# Patient Record
Sex: MALE | Race: WHITE | NOT HISPANIC OR LATINO
[De-identification: names, ages, dates, MRNs, and addresses within clinical notes are randomized per-mention and may not be internally consistent; named-entity substitution may affect disease eponyms.]

---

## 2018-03-30 ENCOUNTER — APPOINTMENT (OUTPATIENT)
Dept: OPHTHALMOLOGY | Facility: CLINIC | Age: 38
End: 2018-03-30
Payer: COMMERCIAL

## 2018-03-30 PROCEDURE — 92004 COMPRE OPH EXAM NEW PT 1/>: CPT

## 2019-03-25 ENCOUNTER — APPOINTMENT (OUTPATIENT)
Dept: OTOLARYNGOLOGY | Facility: CLINIC | Age: 39
End: 2019-03-25

## 2020-10-30 ENCOUNTER — APPOINTMENT (OUTPATIENT)
Dept: INTERNAL MEDICINE | Facility: CLINIC | Age: 40
End: 2020-10-30

## 2020-10-30 ENCOUNTER — APPOINTMENT (OUTPATIENT)
Dept: NEUROLOGY | Facility: CLINIC | Age: 40
End: 2020-10-30

## 2020-12-11 ENCOUNTER — APPOINTMENT (OUTPATIENT)
Dept: INTERNAL MEDICINE | Facility: CLINIC | Age: 40
End: 2020-12-11

## 2022-05-06 ENCOUNTER — APPOINTMENT (OUTPATIENT)
Dept: ORTHOPEDIC SURGERY | Facility: CLINIC | Age: 42
End: 2022-05-06
Payer: COMMERCIAL

## 2022-05-06 VITALS — HEIGHT: 68 IN | WEIGHT: 180 LBS | BODY MASS INDEX: 27.28 KG/M2

## 2022-05-06 PROCEDURE — 99204 OFFICE O/P NEW MOD 45 MIN: CPT

## 2022-05-07 ENCOUNTER — NON-APPOINTMENT (OUTPATIENT)
Age: 42
End: 2022-05-07

## 2022-06-17 ENCOUNTER — APPOINTMENT (OUTPATIENT)
Dept: INTERNAL MEDICINE | Facility: CLINIC | Age: 42
End: 2022-06-17
Payer: COMMERCIAL

## 2022-06-17 VITALS
RESPIRATION RATE: 12 BRPM | SYSTOLIC BLOOD PRESSURE: 150 MMHG | OXYGEN SATURATION: 98 % | HEART RATE: 80 BPM | TEMPERATURE: 98 F | DIASTOLIC BLOOD PRESSURE: 90 MMHG

## 2022-06-17 VITALS — WEIGHT: 165 LBS | BODY MASS INDEX: 25.01 KG/M2 | HEIGHT: 68 IN

## 2022-06-17 DIAGNOSIS — H11.243: ICD-10-CM

## 2022-06-17 DIAGNOSIS — Z00.00 ENCOUNTER FOR GENERAL ADULT MEDICAL EXAMINATION W/OUT ABNORMAL FINDINGS: ICD-10-CM

## 2022-06-17 DIAGNOSIS — M25.559 PAIN IN UNSPECIFIED HIP: ICD-10-CM

## 2022-06-17 DIAGNOSIS — Z80.3 FAMILY HISTORY OF MALIGNANT NEOPLASM OF BREAST: ICD-10-CM

## 2022-06-17 DIAGNOSIS — Z80.42 FAMILY HISTORY OF MALIGNANT NEOPLASM OF PROSTATE: ICD-10-CM

## 2022-06-17 DIAGNOSIS — H15.101 UNSPECIFIED EPISCLERITIS, RIGHT EYE: ICD-10-CM

## 2022-06-17 PROCEDURE — 99386 PREV VISIT NEW AGE 40-64: CPT

## 2022-06-17 NOTE — HEALTH RISK ASSESSMENT
[Very Good] : ~his/her~  mood as very good [Never] : Never [Yes] : Yes [Monthly or less (1 pt)] : Monthly or less (1 point) [1 or 2 (0 pts)] : 1 or 2 (0 points) [Never (0 pts)] : Never (0 points) [No] : In the past 12 months have you used drugs other than those required for medical reasons? No [No falls in past year] : Patient reported no falls in the past year [0] : 2) Feeling down, depressed, or hopeless: Not at all (0) [PHQ-2 Negative - No further assessment needed] : PHQ-2 Negative - No further assessment needed [FreeTextEntry1] : risk for cancer [Audit-CScore] : 1 [de-identified] : as above [de-identified] : as above [QMR2Ivthb] : 0

## 2022-06-17 NOTE — PLAN
[FreeTextEntry1] : White coat Hypertension: per patient has normal BP readings outside of office--pa tient to monitor BP, if trends >130/80 to inform.\par Discussed his personal risk of cancer given family history--reviewed no apparent link between his family history and colon cancer, but will refer to GI per his request for screening. Normal breast exam. He will inquire with family on any MRCA positive or other genetic-related cancers in the family.\par PSA ordered.\par He will have labs done at local lab as he is nonfasting today.\par Healthy diet/exercise reviewed.

## 2022-06-17 NOTE — HISTORY OF PRESENT ILLNESS
[FreeTextEntry1] : new patient/CPE [de-identified] : LORENZO AJNE is a 41 year old male with no significant medical history presents for new patient comprehensive exam.\par He wishes to discuss his own potential risk of cancer given family history of breast cancer and prostate cancer. He denies any apparent changes in bowel habits, no urinary issues. Has switched to a plant-based diet in an effort improve his general health.\par Symptoms of erectile dysfunction, improved on Cialis 20 mg, has not filled recently. He feels Cialis helps more than Viagra, but when he goes for "plane flight I prefer Viagra because it is shorter acting...doesn't make me congested on the flight". \maritza Has h/o white coat hypertension, gets anxious in the office.\maritza Keeps active, walks 3x/week 2 hours at a time. \par Getting  in September.

## 2022-06-17 NOTE — PHYSICAL EXAM
[Normal Sclera/Conjunctiva] : normal sclera/conjunctiva [Normal Outer Ear/Nose] : the outer ears and nose were normal in appearance [Normal Oropharynx] : the oropharynx was normal [Normal Appearance] : normal in appearance [No Masses] : no palpable masses [No Nipple Discharge] : no nipple discharge [Normal] : normal gait, coordination grossly intact, no focal deficits and deep tendon reflexes were 2+ and symmetric [Speech Grossly Normal] : speech grossly normal [Alert and Oriented x3] : oriented to person, place, and time [Normal Insight/Judgement] : insight and judgment were intact [de-identified] : patient declined ophthalmoscopic exam [de-identified] : Pateint declined otoscopic exam. Teeth--++plaque multiple teeth [de-identified] : +small ubsutaneous cyst right/mid back. Left wrist fluid-filled [de-identified] : anxious mood

## 2022-09-09 ENCOUNTER — APPOINTMENT (OUTPATIENT)
Dept: ORTHOPEDIC SURGERY | Facility: CLINIC | Age: 42
End: 2022-09-09

## 2022-09-16 ENCOUNTER — APPOINTMENT (OUTPATIENT)
Dept: ORTHOPEDIC SURGERY | Facility: CLINIC | Age: 42
End: 2022-09-16

## 2022-09-16 ENCOUNTER — NON-APPOINTMENT (OUTPATIENT)
Age: 42
End: 2022-09-16

## 2022-09-16 PROCEDURE — 73610 X-RAY EXAM OF ANKLE: CPT | Mod: RT

## 2022-09-16 PROCEDURE — 99214 OFFICE O/P EST MOD 30 MIN: CPT

## 2022-09-16 PROCEDURE — 73564 X-RAY EXAM KNEE 4 OR MORE: CPT | Mod: 50

## 2022-09-23 ENCOUNTER — APPOINTMENT (OUTPATIENT)
Dept: ORTHOPEDIC SURGERY | Facility: CLINIC | Age: 42
End: 2022-09-23

## 2022-09-23 PROCEDURE — 99214 OFFICE O/P EST MOD 30 MIN: CPT

## 2022-12-07 ENCOUNTER — NON-APPOINTMENT (OUTPATIENT)
Age: 42
End: 2022-12-07

## 2022-12-07 ENCOUNTER — APPOINTMENT (OUTPATIENT)
Dept: OTOLARYNGOLOGY | Facility: CLINIC | Age: 42
End: 2022-12-07

## 2022-12-07 VITALS
WEIGHT: 170 LBS | SYSTOLIC BLOOD PRESSURE: 155 MMHG | BODY MASS INDEX: 25.76 KG/M2 | HEART RATE: 85 BPM | DIASTOLIC BLOOD PRESSURE: 93 MMHG | TEMPERATURE: 97.3 F | HEIGHT: 68 IN

## 2022-12-07 DIAGNOSIS — H92.02 OTALGIA, LEFT EAR: ICD-10-CM

## 2022-12-07 DIAGNOSIS — M26.609 UNSPECIFIED TEMPOROMANDIBULAR JOINT DISORDER: ICD-10-CM

## 2022-12-07 PROCEDURE — 99203 OFFICE O/P NEW LOW 30 MIN: CPT

## 2022-12-07 NOTE — ASSESSMENT
[FreeTextEntry1] : Mr. JANE 42 year M complains of left side ear pain x 2 days. Had a hearing test which was wnl . \par \par Otalgia Left due to TMJ:\par -soft food diet \par -dental evaluation \par -Advil for pain \par -warm and cold compresses\par -Acupuncture advised \par \par \par f/u prn

## 2022-12-07 NOTE — PHYSICAL EXAM
[Midline] : trachea located in midline position [Normal] : no rashes [de-identified] : Pain to palpation left side

## 2022-12-07 NOTE — END OF VISIT
[FreeTextEntry3] : I personally saw and examined LORENZO JANE in detail. I spoke to GUSTAVO Iniguez regarding the assessment and plan of care. I reviewed the above assessment and plan of care, and agree. I have made changes in changes in the body of the note where appropriate.I personally reviewed the HPI, PMH, FH, SH, ROS and medications/allergies. I have spoken to GUSTAVO Iniguez regarding the history and have personally determined the assessment and plan of care, and documented this myself. I was present and participated in all key portions of the encounter and all procedures noted above. I have made changes in the body of the note where appropriate.\par \par Attesting Faculty: See Attending Signature Below \par \par \par  [Time Spent: ___ minutes] : I have spent [unfilled] minutes of time on the encounter.

## 2022-12-07 NOTE — HISTORY OF PRESENT ILLNESS
[de-identified] : PAtient complains of left side ear pain and discomfort x 2 days. He had a hearing test which was wnl, the audiologist told him that he has swelling in his left ear. He went to  was told by 2 MD that his ears are clean and w/o infection. He saw an ENT this morning was told that his ear is normal and w/o infection. \par Hx of tinnitus x 4 years, last month he had a cold and since then the pitch of the tinnitus changed. \par As of late he has been grinding his teeth under a lot of stress  [Hearing Loss] : no hearing loss [Tinnitus] : tinnitus [Otalgia] : otalgia [Nasal Congestion] : no nasal congestion [None] : The patient is currently asymptomatic.

## 2022-12-20 ENCOUNTER — APPOINTMENT (OUTPATIENT)
Dept: ORTHOPEDIC SURGERY | Facility: CLINIC | Age: 42
End: 2022-12-20

## 2022-12-20 VITALS — WEIGHT: 170 LBS | HEIGHT: 68 IN | BODY MASS INDEX: 25.76 KG/M2

## 2022-12-20 PROCEDURE — 99203 OFFICE O/P NEW LOW 30 MIN: CPT

## 2022-12-20 NOTE — HISTORY OF PRESENT ILLNESS
[1] : 2 [Dull/Aching] : dull/aching [Localized] : localized [Full time] : Work status: full time [de-identified] : 12/20/22: Patient is a 41 yo male c/o right knee pain since May 2022 with no specific injury. Was seen by another ortho, told PFS. Not taking any medication for pain. No previous surgeries to right knee.  [] : Post Surgical Visit: no [FreeTextEntry1] : Rt knee [FreeTextEntry5] : PAtient complains of knee pain since 1 week ago has X rays from [facility]\par no injury

## 2022-12-20 NOTE — PHYSICAL EXAM
[5___] : hamstring 5[unfilled]/5 [Negative] : negative Ulices's [Bilateral] : knee bilaterally [] : non-antalgic

## 2022-12-20 NOTE — ASSESSMENT
[FreeTextEntry1] : No pain on exam. Full ROM. No difficulty ambulating\par Reassured\par Recommend course of PT/HEP.\par otc anti-inflammatories / ice if needed\par Follow up prn

## 2022-12-23 ENCOUNTER — APPOINTMENT (OUTPATIENT)
Dept: ORTHOPEDIC SURGERY | Facility: CLINIC | Age: 42
End: 2022-12-23

## 2022-12-23 PROCEDURE — 99214 OFFICE O/P EST MOD 30 MIN: CPT

## 2022-12-27 ENCOUNTER — APPOINTMENT (OUTPATIENT)
Dept: ORTHOPEDIC SURGERY | Facility: CLINIC | Age: 42
End: 2022-12-27

## 2022-12-27 VITALS — HEIGHT: 68 IN | BODY MASS INDEX: 25.76 KG/M2 | WEIGHT: 170 LBS

## 2022-12-27 PROCEDURE — 99203 OFFICE O/P NEW LOW 30 MIN: CPT

## 2022-12-27 NOTE — HISTORY OF PRESENT ILLNESS
[3] : 3 [Dull/Aching] : dull/aching [Localized] : localized [Full time] : Work status: full time [de-identified] : 12/27/22: Here to f/u on right knee. Very concerned after seeing another ortho about his patella tendonitis diagnosis. Has not started PT despite recommended by ortho and myself. No n/t. Not taking any medication for pain. \par \par 12/20/22: Patient is a 43 yo male c/o right knee pain since May 2022 with no specific injury. Was seen by another ortho, told PFS. Not taking any medication for pain. No previous surgeries to right knee.  [] : Post Surgical Visit: no [FreeTextEntry1] : Rt knee [FreeTextEntry5] : Patient returns with more pain in his right knee since 12/23/22

## 2022-12-27 NOTE — ASSESSMENT
[FreeTextEntry1] : Reassured again\par Again, strongly recommend course of PT/HEP\par otc anti-inflammatories as needed\par

## 2022-12-27 NOTE — PHYSICAL EXAM
[Bilateral] : knee bilaterally [5___] : hamstring 5[unfilled]/5 [Negative] : negative Ulices's [] : non-antalgic

## 2022-12-30 ENCOUNTER — APPOINTMENT (OUTPATIENT)
Dept: ORTHOPEDIC SURGERY | Facility: CLINIC | Age: 42
End: 2022-12-30
Payer: COMMERCIAL

## 2022-12-30 PROCEDURE — 99214 OFFICE O/P EST MOD 30 MIN: CPT

## 2023-01-06 ENCOUNTER — APPOINTMENT (OUTPATIENT)
Dept: ORTHOPEDIC SURGERY | Facility: CLINIC | Age: 43
End: 2023-01-06
Payer: COMMERCIAL

## 2023-01-06 VITALS — WEIGHT: 170 LBS | HEIGHT: 68 IN | BODY MASS INDEX: 25.76 KG/M2

## 2023-01-06 PROCEDURE — 73562 X-RAY EXAM OF KNEE 3: CPT | Mod: 50

## 2023-01-06 PROCEDURE — 99213 OFFICE O/P EST LOW 20 MIN: CPT

## 2023-01-06 NOTE — PHYSICAL EXAM
[Bilateral] : knee bilaterally [5___] : hamstring 5[unfilled]/5 [Negative] : negative Ulices's [] : non-antalgic [Right] : right knee [AP] : anteroposterior [Lateral] : lateral [There are no fractures, subluxations or dislocations. No significant abnormalities are seen] : There are no fractures, subluxations or dislocations. No significant abnormalities are seen

## 2023-01-06 NOTE — HISTORY OF PRESENT ILLNESS
[3] : 3 [Dull/Aching] : dull/aching [Localized] : localized [Full time] : Work status: full time [de-identified] : 1-6-23- \par \par 12/27/22: Here to f/u on right knee. Very concerned after seeing another ortho about his patella tendonitis diagnosis. Has not started PT despite recommended by ortho and myself. No n/t. Not taking any medication for pain. \par \par 12/20/22: Patient is a 41 yo male c/o right knee pain since May 2022 with no specific injury. Was seen by another ortho, told PFS. Not taking any medication for pain. No previous surgeries to right knee.  [] : Post Surgical Visit: no [FreeTextEntry1] : Rt knee [FreeTextEntry5] : Patient returns with more pain in his right knee since 12/23/22

## 2023-01-06 NOTE — ASSESSMENT
[FreeTextEntry1] : I am uncertain as to where his pain is coming from, it is out of proportion to physical exam. Will get MRI for further information, start course of PT as well

## 2023-01-13 ENCOUNTER — APPOINTMENT (OUTPATIENT)
Dept: ORTHOPEDIC SURGERY | Facility: CLINIC | Age: 43
End: 2023-01-13

## 2023-01-20 ENCOUNTER — APPOINTMENT (OUTPATIENT)
Dept: ORTHOPEDIC SURGERY | Facility: CLINIC | Age: 43
End: 2023-01-20
Payer: COMMERCIAL

## 2023-01-20 PROCEDURE — 99443: CPT

## 2023-01-25 ENCOUNTER — NON-APPOINTMENT (OUTPATIENT)
Age: 43
End: 2023-01-25

## 2023-01-27 ENCOUNTER — APPOINTMENT (OUTPATIENT)
Dept: ORTHOPEDIC SURGERY | Facility: CLINIC | Age: 43
End: 2023-01-27
Payer: COMMERCIAL

## 2023-01-27 VITALS — HEIGHT: 68 IN | BODY MASS INDEX: 25.76 KG/M2 | WEIGHT: 170 LBS

## 2023-01-27 DIAGNOSIS — M77.8 OTHER ENTHESOPATHIES, NOT ELSEWHERE CLASSIFIED: ICD-10-CM

## 2023-01-27 DIAGNOSIS — M25.522 PAIN IN LEFT ELBOW: ICD-10-CM

## 2023-01-27 DIAGNOSIS — M77.12 LATERAL EPICONDYLITIS, LEFT ELBOW: ICD-10-CM

## 2023-01-27 PROCEDURE — 73080 X-RAY EXAM OF ELBOW: CPT | Mod: LT

## 2023-01-27 NOTE — PHYSICAL EXAM
[de-identified] : General Exam\par \par Well developed, well nourished\par No apparent distress\par Oriented to person, place, and time\par Mood: Normal\par Affect: Normal\par Balance and coordination: Normal\par Gait: Normal\par \par left elbow exam:\par \par Skin: Clean, dry, intact. No ecchymosis. No swelling. No palpable joint effusion. No gross deformity.\par Tenderness: + tenderness to palpation lateral epicondyle, No medial epicondyle, olecranon, radial head. Pain with resisted wrist ext and supination\par ROM:0-140° full pronation full supination\par Stability: Stable to vaus/valgus stress\par Neuro: Negative tinels at ulnar canal, AIN/PIN/Ulnar nerve in tact to motor/sensation.\par Strength: 5/5 elbow flexion, 5/5 elbow extension, 5/5 supination, 5/5 pronation\par Sensation: In tact to light touch throughout\par Vasc: 2+ radial pulse, <2s cap refill\par  [de-identified] : \par The following radiographs were ordered and read by me during this patients visit. I reviewed each radiograph in detail with the patient and discussed the findings as highlighted below. \par \par 3 views left elbow] were obtained today that show no fracture, dislocation. There is no degenerative change seen. There is no malalignment. No obvious osseous abnormality. Otherwise unremarkable.

## 2023-01-27 NOTE — DISCUSSION/SUMMARY
[de-identified] : Left elbow lateral epicondylitis in the setting of using a wheelchair.  I discussed with the patient the treatment of lateral epicondylitis. I discussed that this is a degenerative condition rather than an inflammatory process. The process is reversible, and the best source of treatment is to reduce the offending repetitive overuse injury process. I counseled the patient how to do this. In addition, treatment includes counterforce bracing, physical therapy for stretching and strengthening, and the use of injection therapy (steroids/PRP etc). I also discussed the role of surgical intervention for may become a chronic condition.  All questions answered follow-up as needed.

## 2023-01-27 NOTE — HISTORY OF PRESENT ILLNESS
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  He has been using wheelchair for knee problems x several weeks, developed pain primarily about the lateral and medial  elbow.  \par \par The patient's past medical history, past surgical history, medications, allergies, and social history were reviewed by me today with the patient and documented accordingly. In addition, the patient's family history, which is noncontributory to this visit, was also reviewed.\par

## 2023-02-01 ENCOUNTER — APPOINTMENT (OUTPATIENT)
Dept: OTOLARYNGOLOGY | Facility: CLINIC | Age: 43
End: 2023-02-01

## 2023-02-03 ENCOUNTER — APPOINTMENT (OUTPATIENT)
Dept: ORTHOPEDIC SURGERY | Facility: CLINIC | Age: 43
End: 2023-02-03
Payer: SELF-PAY

## 2023-02-03 VITALS — WEIGHT: 170 LBS | HEIGHT: 68 IN | BODY MASS INDEX: 25.76 KG/M2

## 2023-02-03 DIAGNOSIS — M77.8 OTHER ENTHESOPATHIES, NOT ELSEWHERE CLASSIFIED: ICD-10-CM

## 2023-02-03 PROCEDURE — 99211 OFF/OP EST MAY X REQ PHY/QHP: CPT

## 2023-02-03 NOTE — HISTORY OF PRESENT ILLNESS
[Gradual] : gradual [4] : 4 [2] : 2 [Dull/Aching] : dull/aching [Nothing helps with pain getting better] : Nothing helps with pain getting better [de-identified] : Overusing arms more recently\par \par L more than R elbow pain \par \par Now is better  [] : no [FreeTextEntry1] :  left elbow/ hand  [FreeTextEntry3] : 1-2 weeks ago  [FreeTextEntry5] : he has pain, no injury  [de-identified] : activity  [de-identified] : 01/27/23 [de-identified] : urgent care  [de-identified] : XR

## 2023-02-17 ENCOUNTER — NON-APPOINTMENT (OUTPATIENT)
Age: 43
End: 2023-02-17

## 2023-02-17 ENCOUNTER — APPOINTMENT (OUTPATIENT)
Dept: INTERNAL MEDICINE | Facility: CLINIC | Age: 43
End: 2023-02-17
Payer: COMMERCIAL

## 2023-02-17 ENCOUNTER — APPOINTMENT (OUTPATIENT)
Dept: ORTHOPEDIC SURGERY | Facility: CLINIC | Age: 43
End: 2023-02-17
Payer: COMMERCIAL

## 2023-02-17 VITALS — WEIGHT: 175 LBS | BODY MASS INDEX: 26.52 KG/M2 | HEIGHT: 68 IN

## 2023-02-17 DIAGNOSIS — M25.572 PAIN IN RIGHT ANKLE AND JOINTS OF RIGHT FOOT: ICD-10-CM

## 2023-02-17 DIAGNOSIS — M76.50 PATELLAR TENDINITIS, UNSPECIFIED KNEE: ICD-10-CM

## 2023-02-17 DIAGNOSIS — M25.571 PAIN IN RIGHT ANKLE AND JOINTS OF RIGHT FOOT: ICD-10-CM

## 2023-02-17 PROCEDURE — 99214 OFFICE O/P EST MOD 30 MIN: CPT

## 2023-02-17 PROCEDURE — 99214 OFFICE O/P EST MOD 30 MIN: CPT | Mod: 25

## 2023-02-17 NOTE — HISTORY OF PRESENT ILLNESS
[de-identified] : Patient is here for evaluation of bilateral lower extremity pain.  Patient has been having 2-3 months of atraumatic onset of bilateral thigh, knee and ankle pain.  Patient is seen numerous providers for these issues already.  Review of his chart shows he seen multiple orthopedic providers as well as his PCP as recently as yesterday.  Patient has been intermittently using a wheelchair as he feels like he cannot walk and support himself at all times.  Patient feels like he has achiness as well as intermittent burning in different parts of his posterior thigh, lower leg, ankle and foot.  Patient has been utilizing virtual physical therapy with a physiotherapist in [place] to treat this issue.  No medications as of yet.\par \par The patient's past medical history, past surgical history, medications and allergies were reviewed by me today and documented accordingly. In addition, the patient's family and social history, which were noncontributory to this visit, were reviewed also. Intake form was reviewed. The patient has no family history of arthritis.

## 2023-02-17 NOTE — PHYSICAL EXAM
[de-identified] : anxious mood, appears slightly unkempt [de-identified] : nontender on palpation of all lower extremity joints bilaterally, normal ROM. Area of his subjective pain is in anterior ankles extending to shins and in posterior lower thighs/hamstrings. [de-identified] : very anxious mood

## 2023-02-17 NOTE — HISTORY OF PRESENT ILLNESS
[FreeTextEntry8] : LORENZO JANE is a 42 year old male who presents for acute focused medical visit, with c/o burning sensation in lower extremities.\par We had briefly spoken over the phone 2 days ago when he had described that for the past week has developed burning pain in posterior thigh/hamstring area, as well as in anterior aspect of ankles radiating into shins bilaterally. Pain is "excruciating", however is able to walk. He has been using wheelchair in an effort to "immobilize" his lower body. \par Preceding his pain, he has been doing hamstring/quad exercises for management of his bilateral knee pains under guidance of a Physiotherapist (who is based in [place] and he was doing exercises per her guidance on Skype)--he describes having done various exercises involving repetitive extension/flexion of feet, going from toes to heels. Immediately before onset of his burning pains, he was "going on my tiptoes for a few minutes at a time". He admits he had "googled" and self-diagnosed  Hoffas fat pad impingement, he also verbalizes concern over possible "rheumatoid arthritis or other rheumatological condition". He has had no history of swollen joints, morning stiffness. Has h/o elbow tendinitis.\par Note he has seen multiple orthopedists prior to his visit with physiotherapist for his knee symptoms. \par He admits he has significant "health anxiety" which he acknowledges is likely contributing to/worsening his symtpoms. He declines to take any medications for his anxiety due to h/o "tinnitus" with all medications. He is a psychiatrist by profession.

## 2023-02-17 NOTE — PHYSICAL EXAM
[de-identified] : Constitutional: Well-nourished, well-developed, No acute distress, + poor hygiene with noted foul-smelling odor\par Respiratory:  Good respiratory effort, no SOB\par Lymphatic: No regional lymphadenopathy, no lymphedema\par Psychiatric: Pleasant and normal affect, alert and oriented x3\par Musculoskeletal: normal except where as noted in regional exam\par \par Bilateral knees:\par APPEARANCE: + Apprehension and guarding during physical exam maneuvers.  No marked deformities, no swelling or malalignment\par POSITIVE TENDERNESS:  + crepitus of the anterior knee, and tenderness of patellar retinaculum\par NONTENDER: jt lines b/l, patellar & quadriceps tendons, MCL/LCL, ITB at the lateral femoral condyle & Gerdy's tubercle, pes bursa. \par ROM: full with discomfort in knee flexion and knee extension\par RESISTIVE TESTING: + discomfort with knee ext from deep knee flexion (stretched position), painless knee flexion. \par SPECIAL TESTS: stable v/v stress. painless grind. neg Lachman's. neg ant/post drawer. neg Ulices's. \par \par Bilateral ankles:\par APPEARANCE: no swelling, no marked deformities or malalignment\par POSITIVE TENDERNESS: none\par NONTENDER: medial malleolus, lateral malleolus, tibialis posterior tendon, achilles tendon, no marked thickening of tendon, ATFL, CFL, PTFL, anterior tibiofibular ligament (high ankle), sinus tarsus, deltoid ligaments, 5th metatarsal. \par RANGE OF MOTION: full & painless. \par RESISTIVE TESTING: painless resisted dorsiflexion, plantar flexion, inversion & eversion. \par SPECIAL TESTS: neg anterior drawer. neg talar tilt. neg Kleiger's\par NEURO: Normal sensation of LE, DTRs 2+/4 patella and achilles\par PULSES: 2+ DP/PT pulses\par

## 2023-02-17 NOTE — DISCUSSION/SUMMARY
[de-identified] : Patient was seen today for evaluation and management of chronic intermittent bilateral thigh, knee, calf, ankle and foot pain.  Patient has seen a multitude of providers for these issues.  Patient has essentially no abnormal findings on clinical exam, he has clinical exam findings consistent with patellofemoral pain syndrome and diffuse myofascial pain.  Patient describes his pain as 8 out of 10 and burning in nature, however his pain does not follow any known neuropathic pattern.  He has no evidence of radiculopathy as he has no reproducible pain on clinical exam, he just points at different parts of his thigh, knee, calf, ankle, and foot at different parts of today's encounter describing pain that is burning in nature to him.  Patient is significantly apprehensive during clinical exam, he will not let me fully flex or extend his knee, he states he is afraid of compressing his Hoffa's fat pad.  Patient is advised that inflammation within Hoffa's fat pad is essentially an MRI finding only.  This does not cause mechanical obstruction limiting knee extension, nor is it anything that any of my orthopedic associates would usually surgically address in isolation.  Patient is advised that he is perseverating over an MRI finding and extrapolating it to make it seem like it is the etiology of his pain.  Advised the patient if he continues to have burning pain of the lower extremities I would recommend neurology consultation, however at this time I do not feel that it would be beneficial to him as I feel the majority of the patient's pain is psychiatric in nature.  Patient has documented anxiety about health as per his PCP, I agree with this assessment as patient is perseverating over much of his clinical exam and his symptoms without any reproducible symptoms on today's orthopedic evaluation.  I had a very beba and direct conversation with the patient today.  It seems that the majority of his issues are psychiatric in nature, and I advised the patient as such.  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  Patient has been utilizing virtual physical therapy with a physiotherapist in [place].  They recently recommended he limit his walking to 1600 steps per day, and gradually increase his walking tolerance thereafter.  Patient was seen by his PCP as recently as yesterday, he recommended 4-6 weeks of full rest from exercise as he is concerned about tendinitis causing compression of his nerves.  I advised the patient that I respectfully disagree with this recommendation, if the patient has any neuropathy and/or potentially even developing chronic regional pain syndrome prolonged rest is only going to worsen his condition.  The patient's pain pattern and burning pain are not consistent with any specific nerve compression that could be identified on exam today, I do not feel that he has any local tendinitis causing nerve compression.  At this time I think prolonged rest is only going to worsen the patient's condition and apprehension about motion in general.  I recommend the patient proceed with a course of in person physical therapy, he certainly needs guidance and reassurance throughout his exercises, I do not feel performing these exercises virtually is to his benefit.  He would also benefit from some hands-on soft tissue work to both desensitize his apprehension, as well as improve his range of motion and function.  Patient is advised I do not recommend a step limit on him at this time, his apprehension and pain will serve as enough of the limit, but he is encouraged to start ambulating normally.  There is no identifiable reason why this 42-year-old male should be utilizing a wheelchair for ambulatory assistive device, I feel if he continues to do so it is only going to worsen his condition.  Patient inquired about possible rheumatology consultation, he is advised that he has diffuse myofascial pain, he does not have localized multijoint pain, this is unlikely autoimmune/rheumatological in etiology.  Patient is accompanied to the office today by his father.  Patient appreciates and agrees with current plan.\par \par \par This note was generated using dragon medical dictation software.  A reasonable effort has been made for proofreading its contents, but typos may still remain.  If there are any questions or points of clarification needed please notify my office.

## 2023-02-17 NOTE — PLAN
[FreeTextEntry1] : Health anxiety: counseled patient on this, encouraged him to seek evaluation with psychiatrist and to start management. At this time he declines all meds, wishes to self-treat with OTC supplements.\par #Lower extremity burning pain: most likely secondary to his repetitive lower leg exercises, may have irritated nerves--advised on full rest from exercises for 4-6 weeks. He declines medication such as Gabapentin due to fear of tinnitus. He has appt with ortho scheduled later today for evaluation\par

## 2023-02-24 ENCOUNTER — APPOINTMENT (OUTPATIENT)
Dept: ORTHOPEDIC SURGERY | Facility: CLINIC | Age: 43
End: 2023-02-24
Payer: COMMERCIAL

## 2023-02-24 DIAGNOSIS — M77.50 OTHER ENTHESOPATHY OF UNSPCFD FOOT AND ANKLE: ICD-10-CM

## 2023-02-24 PROCEDURE — 99213 OFFICE O/P EST LOW 20 MIN: CPT

## 2023-02-24 NOTE — PHYSICAL EXAM
[Bilateral] : foot and ankle bilaterally [] : no gross deformity [FreeTextEntry3] : There is no swelling or deformity. Normal capillary refill. [FreeTextEntry8] : Pt. refuses physical exam today.  [FreeTextEntry9] : Pt. refuses ROM testing.  [de-identified] : Pt. refuses strength testing.  [de-identified] : Pt. refuses physical exam today. [de-identified] : Pt. refuses physical exam.  [de-identified] : Pt. refuses physical exam.

## 2023-02-24 NOTE — ASSESSMENT
[FreeTextEntry1] : Patient was explained that without a proper physical exam it is very difficult, borderline impossible to make an accurate assessment/diagnosis about his ankle issues. After speaking for several minutes and explaining what the physical exam would entail, he again declines having it performed in office today. Explained that what he is experiencing could be a multitude of things but is likely related to an inflammatory process. NSAIDS, ice and supportive footwear discussed. He states he will come back if symptoms improve enough where he feels he can tolerate a physical examination. \par \par

## 2023-02-24 NOTE — HISTORY OF PRESENT ILLNESS
[de-identified] : Patient is a 42 year old male who presents today for evaluation of both ankles, RT worse than LT. Denies trauma but developed pain in his ankles after doing exercises for his knees (under guidance of virtual physical therapist) early February 2023. Denies trauma/previous injury. Some tingling, no numbness. Ice to affected area. Shockwave and laser treatment by podiatry with some relief but states that any movement of his ankles causes pain. No other formal treatment to date. Paper films brought in from podiatry of the RT ankle are unremarkable. NWB in sneakers using a wheelchair. \par  [] : Post Surgical Visit: no [FreeTextEntry1] : B/L Ankles/Feet

## 2023-02-26 ENCOUNTER — APPOINTMENT (OUTPATIENT)
Dept: ORTHOPEDIC SURGERY | Facility: CLINIC | Age: 43
End: 2023-02-26
Payer: COMMERCIAL

## 2023-02-26 VITALS — WEIGHT: 175 LBS | HEIGHT: 68 IN | BODY MASS INDEX: 26.52 KG/M2

## 2023-02-26 DIAGNOSIS — S39.012A STRAIN OF MUSCLE, FASCIA AND TENDON OF LOWER BACK, INITIAL ENCOUNTER: ICD-10-CM

## 2023-02-26 PROCEDURE — 99213 OFFICE O/P EST LOW 20 MIN: CPT

## 2023-02-26 NOTE — HISTORY OF PRESENT ILLNESS
[Gradual] : gradual [Sports related] : sports related [9] : 9 [8] : 8 [Localized] : localized [Constant] : constant [Lying in bed] : lying in bed [Full time] : Work status: full time [de-identified] : 42 with lower back pain for a few days.  Pain started after performing a PT protocol of his knees.  Pain is across the lower back.  Some radiation of pain and tingling to the thighs.  He also reports burning in both ankles.  He has h/o tinitis and has to be careful with medications.  He does not want xrays. [] : This patient has had an injection before: no [FreeTextEntry1] : lower back [FreeTextEntry2] : physical therapy exercise [FreeTextEntry3] : 2/1/23 [FreeTextEntry5] : pt sees a PT who prescribed exercises, says they exacerbated lower back pain

## 2023-02-26 NOTE — ASSESSMENT
[FreeTextEntry1] : He is referred for a course of PT and prescribed a MDP.  He did not want to get xrays in the office today.  He is referred for an MRI of the lumbar spine to evaluate for HNP.  he has f/u with Dr Waterman scheduled this week.

## 2023-02-26 NOTE — PHYSICAL EXAM
[NL (45)] : extension 45 degrees [NL (40)] : right lateral bending 40 degrees [5___] : right extensor hallicus longus 5[unfilled]/5 [] : patient ambulates without assistive device [TWNoteComboBox7] : forward flexion 45 degrees [de-identified] : extension 10 degrees [de-identified] : left lateral bending 10 degrees [de-identified] : left lateral rotation 10 degrees [de-identified] : right lateral bending 10 degrees [TWNoteComboBox6] : right lateral rotation 25 degrees

## 2023-03-01 ENCOUNTER — APPOINTMENT (OUTPATIENT)
Dept: ORTHOPEDIC SURGERY | Facility: CLINIC | Age: 43
End: 2023-03-01
Payer: COMMERCIAL

## 2023-03-01 VITALS — BODY MASS INDEX: 26.52 KG/M2 | HEIGHT: 68 IN | WEIGHT: 175 LBS

## 2023-03-01 DIAGNOSIS — S86.912A STRAIN OF UNSPECIFIED MUSCLE(S) AND TENDON(S) AT LOWER LEG LEVEL, LEFT LEG, INITIAL ENCOUNTER: ICD-10-CM
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
## 2023-03-01 DIAGNOSIS — Z78.9 OTHER SPECIFIED HEALTH STATUS: ICD-10-CM
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
## 2023-03-01 DIAGNOSIS — S86.911A STRAIN OF UNSPECIFIED MUSCLE(S) AND TENDON(S) AT LOWER LEG LEVEL, RIGHT LEG, INITIAL ENCOUNTER: ICD-10-CM

## 2023-03-01 PROCEDURE — 99213 OFFICE O/P EST LOW 20 MIN: CPT

## 2023-03-01 NOTE — ASSESSMENT
[FreeTextEntry1] : patient declined imaging\par patient clinically w/o sig symptoms in office today\par possible that he had had a muscle strain that has largely resolved\par also may have radic component -- has mri pending\par no intervention indicated at this time\par has f/up w/ dr galvan after mri\par can consider emg in future as clinically indidcated\par f/up prn

## 2023-03-01 NOTE — HISTORY OF PRESENT ILLNESS
[Burning] : burning [Nothing helps with pain getting better] : Nothing helps with pain getting better [7] : 7 [6] : 6 [de-identified] : 03/01/2023: 1 month bilateral foot and ankle pain assoc w/ doing virtual PT sessions. has seen multiple providers but previously refused exam. no prior issues. patient unsure if tingling as well. also having back pain. denies dm/tob.  [] : Post Surgical Visit: no [FreeTextEntry1] : BIBI ankle/foot  [de-identified] : podiatrist  [de-identified] : xrays

## 2023-03-01 NOTE — PHYSICAL EXAM
[Bilateral] : foot and ankle bilaterally [NL (40)] : plantar flexion 40 degrees [NL 30)] : inversion 30 degrees [NL (20)] : eversion 20 degrees [5___] : eversion 5[unfilled]/5 [2+] : dorsalis pedis pulse: 2+ [] : patient ambulates without assistive device [FreeTextEntry8] : no sig point ttp

## 2023-03-02 ENCOUNTER — APPOINTMENT (OUTPATIENT)
Dept: INTERNAL MEDICINE | Facility: CLINIC | Age: 43
End: 2023-03-02
Payer: COMMERCIAL

## 2023-03-02 PROCEDURE — 99214 OFFICE O/P EST MOD 30 MIN: CPT | Mod: 25

## 2023-03-03 ENCOUNTER — APPOINTMENT (OUTPATIENT)
Dept: CARDIOLOGY | Facility: CLINIC | Age: 43
End: 2023-03-03

## 2023-03-03 ENCOUNTER — APPOINTMENT (OUTPATIENT)
Dept: VASCULAR SURGERY | Facility: CLINIC | Age: 43
End: 2023-03-03
Payer: COMMERCIAL

## 2023-03-03 ENCOUNTER — APPOINTMENT (OUTPATIENT)
Dept: ORTHOPEDIC SURGERY | Facility: CLINIC | Age: 43
End: 2023-03-03
Payer: COMMERCIAL

## 2023-03-03 VITALS
HEART RATE: 86 BPM | TEMPERATURE: 98.1 F | BODY MASS INDEX: 26.52 KG/M2 | HEIGHT: 68 IN | DIASTOLIC BLOOD PRESSURE: 96 MMHG | WEIGHT: 175 LBS | SYSTOLIC BLOOD PRESSURE: 149 MMHG

## 2023-03-03 VITALS — BODY MASS INDEX: 26.52 KG/M2 | WEIGHT: 175 LBS | HEIGHT: 68 IN

## 2023-03-03 PROCEDURE — 99203 OFFICE O/P NEW LOW 30 MIN: CPT

## 2023-03-03 PROCEDURE — 99214 OFFICE O/P EST MOD 30 MIN: CPT

## 2023-03-03 PROCEDURE — 93970 EXTREMITY STUDY: CPT

## 2023-03-06 ENCOUNTER — APPOINTMENT (OUTPATIENT)
Dept: ORTHOPEDIC SURGERY | Facility: CLINIC | Age: 43
End: 2023-03-06

## 2023-03-08 ENCOUNTER — NON-APPOINTMENT (OUTPATIENT)
Age: 43
End: 2023-03-08

## 2023-03-08 ENCOUNTER — APPOINTMENT (OUTPATIENT)
Dept: INTERNAL MEDICINE | Facility: CLINIC | Age: 43
End: 2023-03-08
Payer: COMMERCIAL

## 2023-03-08 VITALS — HEIGHT: 68 IN | WEIGHT: 162 LBS | BODY MASS INDEX: 24.55 KG/M2

## 2023-03-08 DIAGNOSIS — F41.8 OTHER SPECIFIED ANXIETY DISORDERS: ICD-10-CM

## 2023-03-08 DIAGNOSIS — R20.8 OTHER DISTURBANCES OF SKIN SENSATION: ICD-10-CM

## 2023-03-08 PROCEDURE — 99213 OFFICE O/P EST LOW 20 MIN: CPT | Mod: 25

## 2023-03-08 NOTE — PLAN
[FreeTextEntry1] : #Neuropathy in lower legs: to continue workup as guided by orthopedist, podiatry, neurology. Patient is requesting renewal of pentoxyfylline but per his report he saw a vascular surgeon with whom workup revealed "normal blood flow". As such, there is no indication to continue this med and suggest patient discontinue it. \par #Health anxiety: encouraged patient to seek evaluation with psychiatry. I am concerned most if not all of his current medical symptoms are stemming from health or generalized anxiety disorder. I discussed with him at length that he might also have a component of somatoform disorder and as such strongly advised him to see psychiatrist and therapist without delay. At this time he verbalizes hesitance to take any psych medications due to his "fear" of ototoxic meds. \par \par F/u prn

## 2023-03-08 NOTE — PHYSICAL EXAM
[Normal] : normal rate, regular rhythm, normal S1 and S2 and no murmur heard [No Carotid Bruits] : no carotid bruits [No Varicosities] : no varicosities [Pedal Pulses Present] : the pedal pulses are present [No Edema] : there was no peripheral edema [No Extremity Clubbing/Cyanosis] : no extremity clubbing/cyanosis [Grossly Normal Strength/Tone] : grossly normal strength/tone [Coordination Grossly Intact] : coordination grossly intact [No Focal Deficits] : no focal deficits [Normal Gait] : normal gait [Alert and Oriented x3] : oriented to person, place, and time [de-identified] : anxious mood, appears slightly unkempt [de-identified] : deferred exam today [de-identified] : very anxious mood

## 2023-03-08 NOTE — HISTORY OF PRESENT ILLNESS
[FreeTextEntry8] : Pt presents for acute evaluation -\par has been having many weeks of LE burning sensation, feels as if it is worsening.\par Thinks he has patellofemoral syndrome.\par \par Feet feel cold.\par No redness/ color change.\par Pain seems to have started after PT when he was doing exercises in which he was flexing his foot and standing on tiptoes.\par Also feels as if his ankles are swollen, R>L\par Has been to multiple orthopedics and neurologists\par Has MRI of LS spine scheduled, and has been advised to have EMG\par He states he had "extensive bloodwork" done recently by neurology, but those results are not available to me today.\par \par Requests pentoxifylline - states he has tried this in the past and it has helped.

## 2023-03-08 NOTE — HISTORY OF PRESENT ILLNESS
[FreeTextEntry8] : LORENZO JANE is a 42 year old male who presents for medical followup of how lower leg burning pains. \par He is following with several different podiatrists, orthopedists and has an appt with [facility] specialist for "CRPS" which is what he is concerned he might have. \par -He used to take Pentoxifylline in the past "to prevent Peyronnie's disease" and then stopped the med years ago. He discussed with Dr. Moody, started Pentoxifylline for 3 weeks pending evaluation with vascular surgeon.. He is unsure that this changed his symptom at all. Still with buring pain in his legs and feels like his feet get cold, though never blue and was told by vascular surgeon per patient this his blood floow both venous and arterial are "all normal"/

## 2023-03-08 NOTE — REVIEW OF SYSTEMS
[Muscle Pain] : muscle pain [Muscle Weakness] : muscle weakness [Back Pain] : back pain [Negative] : Gastrointestinal

## 2023-03-08 NOTE — ASSESSMENT
[FreeTextEntry1] : will give trial of pentoxifylline though skeptical of benefit.\par \par Consider cardiovascular evaluation - names given - Dr Jb Hamlin\par may also get evaluation from vascular surgery\par \par Obtain recent blood work and tests from neurology\par proceed with MRI / EMG\par \par F/U with Dr Onofre after completing above.\par \par F/U if symptoms do not resolve, or if they should change/worsen.\par \par

## 2023-03-10 ENCOUNTER — APPOINTMENT (OUTPATIENT)
Dept: ORTHOPEDIC SURGERY | Facility: CLINIC | Age: 43
End: 2023-03-10

## 2023-03-13 NOTE — HISTORY OF PRESENT ILLNESS
[de-identified] : 03/03/2023 - The patient is a 42 year male who presents today complaining of low back pain, radiating into the legs and feet present for 1 month. Low back pain onset after a certain exercise at physical therapy. Increased low back pain worse with rotation. He reports no history of back pain. Indicates burning sensation in the b/l feet and ankle region after PT. His primary complaint is foot/ankle burning. BLE strength is intact. Completed 2 weeks of pt for the legs. Patient has tinnitus. \par \par Date of Injury/Onset: 1 month\par Pain:    At Rest:3 /10 \par With Activity:  3/10 \par Mechanism of injury: after exercising\par Quality of symptoms: tender, achy, burning\par Improves with: not twisting\par Worse with: twisting, lying on side\par Prior treatment: no\par Prior Imaging: no\par Additional Information: None\par \par

## 2023-03-13 NOTE — DISCUSSION/SUMMARY
[de-identified] : Patient requires a study at one of 4 facilities which offer a ashley toshib orian 1.5 T MRI preferably with pema because of concomitant tinitis. There are none of these machines in [place]. MRI facilities include [facility], [place], [facility], [place]. [facility], [place]. [facility], [place]. Request mri to evaluate nerve impingement as etiology of persistent symptoms refractory to nsaids and PT, r/o hnp vs stenosis\par \par Prior to appointment and during encounter with patient extensive medical records were reviewed including but not limited to, hospital records, outpatient records, imaging results, and lab data.During this appointment the patient was examined, diagnoses were discussed and explained in a face to face manner. In addition extensive time was spent reviewing aforementioned diagnostic studies. Counseling including abnormal image results, differential diagnoses, treatment options, risk and benefits, lifestyle changes, current condition, and current medications was performed. Patient's comments, questions, and concerns were addressed and patient verbalized understanding. Based on this patient's presentation at our office, which is an orthopedic spine surgeon's office, this patient inherently / intrinsically has a risk, however minute, of developing issues such as Cauda equina syndrome, bowel and bladder changes, or progression of motor or neurological deficits such as paralysis which may be permanent.\par \par EFFIE STARKS Acting as a Scribe for Effie Watts, attest that this documentation has been prepared under the direction and in the presence of Provider Jairon Waterman MD.

## 2023-03-13 NOTE — PHYSICAL EXAM
[Flexion] : flexion [Extension] : extension [Normal Coordination] : normal coordination [Normal DTR UE/LE] : normal DTR UE/LE  [Normal Sensation] : normal sensation [Normal Mood and Affect] : normal mood and affect [Orientated] : orientated [Able to Communicate] : able to communicate [Normal Skin] : normal skin [No Rash] : no rash [No Ulcers] : no ulcers [No Lesions] : no lesions [No obvious lymphadenopathy in areas examined] : no obvious lymphadenopathy in areas examined [Well Developed] : well developed [Peripheral vascular exam is grossly normal] : peripheral vascular exam is grossly normal [No Respiratory Distress] : no respiratory distress [] : achilles and patella reflexes are symmetrical [de-identified] : Constitutional:\par - General Appearance:\par Unremarkable\par Body Habitus\par Well Developed\par Well Nourished\par Body Habitus\par No Deformities\par Well Groomed\par Ability To communicate:\par Normal\par Neurologic:\par Global sensation is intact to upper and lower extremities. See examination of Neck and/or Spine\par for exceptions.\par Orientation to Time, Place and Person is: Normal\par Mood And Affect is Normal\par Skin:\par - Head/Face, Right Upper/Lower Extremity, Left Upper/Lower Extremity: Normal\par See Examination of Neck and/or Spine for exceptions\par Cardiovascular:\par Peripheral Cardiovascular System is Normal\par Palpation of Lymph Nodes:\par Normal Palpation of lymph nodes in: Axilla, Cervical, Inguinal\par Abnormal Palpation of lymph nodes in: None\par

## 2023-03-24 ENCOUNTER — APPOINTMENT (OUTPATIENT)
Dept: PHYSICAL MEDICINE AND REHAB | Facility: CLINIC | Age: 43
End: 2023-03-24

## 2023-03-28 ENCOUNTER — NON-APPOINTMENT (OUTPATIENT)
Age: 43
End: 2023-03-28

## 2023-03-31 ENCOUNTER — MED ADMIN CHARGE (OUTPATIENT)
Age: 43
End: 2023-03-31

## 2023-03-31 ENCOUNTER — APPOINTMENT (OUTPATIENT)
Dept: INTERNAL MEDICINE | Facility: CLINIC | Age: 43
End: 2023-03-31
Payer: COMMERCIAL

## 2023-03-31 VITALS — HEIGHT: 68 IN | WEIGHT: 162 LBS | BODY MASS INDEX: 24.55 KG/M2

## 2023-03-31 DIAGNOSIS — R52 PAIN, UNSPECIFIED: ICD-10-CM

## 2023-03-31 PROCEDURE — 96372 THER/PROPH/DIAG INJ SC/IM: CPT

## 2023-03-31 PROCEDURE — 99213 OFFICE O/P EST LOW 20 MIN: CPT | Mod: 25

## 2023-03-31 RX ORDER — CYANOCOBALAMIN 1000 UG/ML
1000 INJECTION INTRAMUSCULAR; SUBCUTANEOUS
Qty: 0 | Refills: 0 | Status: COMPLETED | OUTPATIENT
Start: 2023-03-31

## 2023-03-31 RX ADMIN — CYANOCOBALAMIN 1000 MCG/ML: 1000 INJECTION INTRAMUSCULAR; SUBCUTANEOUS at 00:00

## 2023-03-31 NOTE — PHYSICAL EXAM
[No Acute Distress] : no acute distress [Well Nourished] : well nourished [No Respiratory Distress] : no respiratory distress  [No Accessory Muscle Use] : no accessory muscle use [No Edema] : there was no peripheral edema [Coordination Grossly Intact] : coordination grossly intact [No Focal Deficits] : no focal deficits [Normal Gait] : normal gait [de-identified] : u [de-identified] : normal gait, no motor deficits noted. [de-identified] : anxious mood

## 2023-03-31 NOTE — HISTORY OF PRESENT ILLNESS
[FreeTextEntry1] : update re: burning pain RLE, b12 injection [de-identified] : LORENZO JANE is a 40 y/o M who presents for followup of burning pain in right lower extremity.\par Discussed in detail on prior visits. Now following with podiatrist, who sent letter to me detailing diagnosis of neuritis with recommendation for B12 injections. Plan on weekly B12 injections x 4 and then transition to oral daily B12 1000 mcg. He states that his podiatrist also initiated him on a Prednisone tapering dose, currently on 45 mg.\par

## 2023-03-31 NOTE — PLAN
[FreeTextEntry1] : RLE burning pain: following with multiple subspecialists for this, defer management and further workup of this to his current Podiatrist, Orthopedist and Neurologist. Will provide B12 injections monthly x 4 and then transition to oral B12 supplementation given no documented B12 deficiency on record and no standard B12 dose recommended for a diagnosis of neuritis (dx by podiatrist).

## 2023-03-31 NOTE — REVIEW OF SYSTEMS
[Negative] : Respiratory [Chest Pain] : no chest pain [Palpitations] : no palpitations [de-identified] : as pre HPI

## 2023-04-07 ENCOUNTER — APPOINTMENT (OUTPATIENT)
Dept: INTERNAL MEDICINE | Facility: CLINIC | Age: 43
End: 2023-04-07
Payer: COMMERCIAL

## 2023-04-07 DIAGNOSIS — E53.8 DEFICIENCY OF OTHER SPECIFIED B GROUP VITAMINS: ICD-10-CM

## 2023-04-07 PROCEDURE — 96372 THER/PROPH/DIAG INJ SC/IM: CPT

## 2023-04-07 RX ORDER — CYANOCOBALAMIN 1000 UG/ML
1000 INJECTION INTRAMUSCULAR; SUBCUTANEOUS
Qty: 0 | Refills: 0 | Status: COMPLETED | OUTPATIENT
Start: 2023-04-07

## 2023-04-07 RX ADMIN — CYANOCOBALAMIN 1000 MCG/ML: 1000 INJECTION, SOLUTION INTRAMUSCULAR at 00:00

## 2023-04-16 ENCOUNTER — EMERGENCY (EMERGENCY)
Facility: HOSPITAL | Age: 43
LOS: 1 days | Discharge: HOME | End: 2023-04-16
Payer: COMMERCIAL

## 2023-04-16 VITALS
SYSTOLIC BLOOD PRESSURE: 128 MMHG | WEIGHT: 169.98 LBS | HEIGHT: 68 IN | DIASTOLIC BLOOD PRESSURE: 87 MMHG | OXYGEN SATURATION: 97 % | RESPIRATION RATE: 16 BRPM | TEMPERATURE: 98 F | HEART RATE: 70 BPM

## 2023-04-16 VITALS
HEART RATE: 68 BPM | OXYGEN SATURATION: 96 % | TEMPERATURE: 98 F | RESPIRATION RATE: 16 BRPM | SYSTOLIC BLOOD PRESSURE: 133 MMHG | DIASTOLIC BLOOD PRESSURE: 93 MMHG

## 2023-04-16 PROCEDURE — 99284 EMERGENCY DEPT VISIT MOD MDM: CPT

## 2023-04-16 PROCEDURE — 99282 EMERGENCY DEPT VISIT SF MDM: CPT

## 2023-04-16 NOTE — ED ADULT NURSE NOTE - NSIMPLEMENTINTERV_GEN_ALL_ED
Implemented All Universal Safety Interventions:  [place] to call system. Call bell, personal items and telephone within reach. Instruct patient to call for assistance. Room bathroom lighting operational. Non-slip footwear when patient is off stretcher. Physically safe environment: no spills, clutter or unnecessary equipment. Stretcher in lowest position, wheels locked, appropriate side rails in place.

## 2023-04-16 NOTE — ED ADULT TRIAGE NOTE - CHIEF COMPLAINT QUOTE
lower back pain and worsening bilateral LE numbness, tingling. recent MRI showed L4-L5 disc bulging and annular tear.

## 2023-04-16 NOTE — ED PROVIDER NOTE - ATTENDING CONTRIBUTION TO CARE
MD Cruz:  patient seen and evaluated with the resident.  I was present for key portions of the History & Physical, and I agree with the Impression & Plan.  43 yo M, c/o lumbar back pain  Duration 2 mos  Location: lumbar, buttocks, bilateral posterior thighs  Quality: "buzzing"  Associated Sx:  No weakness/numbness, no bowel/bladder incontinence, no urinary hesitancy, no saddle anaesthesia, no CP/SOB, no abdominal pain, & no fever/chills.    VS: wnl.  Physical Exam: adult M, NAD, NCAT, PERRL, EOMI, neck supple, CTA B, RRR, Abd: s/nd/nt, Ext: no edema.  Spine: no midline pain, no step-offs, + lumbar paraspinal muscle spasm,  Strength in BLE 5/5. MSK: no pain in hip with axial load.   MDM:   Impression:  lumbar back pain more consistent with sciatica w/o H&P features of a more serious etiology such as cord injury, epidural abscess, REGLA, Ao pathology, ACS, or acute intraabdominal pathology.    Plan:  pain control with NSAIDs, +/-muscle relaxants, PMD f/u with outpatient MRI if pain persists, strict return precautions.  Patient is refusing Tylenol, ibuprofen, lidocaine patch; as these "make his chronic tinnitus worse"  Patient advised to follow-up with physical therapist, spine surgery, pain management -as his therapeutic options seem limited.

## 2023-04-16 NOTE — ED ADULT NURSE NOTE - OBJECTIVE STATEMENT
Pt c/o "continued chronic lower mid lumber pain radiating to bilat LE with numbness/tingling. No pain meds tried due to tinnitus."

## 2023-04-16 NOTE — ED PROVIDER NOTE - CLINICAL SUMMARY MEDICAL DECISION MAKING FREE TEXT BOX
42-year-old male chief complaint lower back pain.  With recent MRI.  Given recent MRI and lack of concerning/red flat flag syndromes will refer for rapid spinal center care. 42-year-old male chief complaint lower back pain.  With recent MRI.  Given recent MRI and lack of concerning/red flat flag syndromes will refer for rapid spinal center care.    MD Cruz MDM  Impression:  lumbar back pain more consistent with sciatica w/o H&P features of a more serious etiology such as cord injury, epidural abscess, REGLA, Ao pathology, ACS, or acute intraabdominal pathology.    Plan:  pain control with NSAIDs, +/-muscle relaxants, PMD f/u with outpatient MRI if pain persists, strict return precautions.  Patient is refusing Tylenol, ibuprofen, lidocaine patch; as these "make his chronic tinnitus worse"  Patient advised to follow-up with physical therapist, spine surgery, pain management -as his therapeutic options seem limited.

## 2023-04-16 NOTE — ED ADULT NURSE NOTE - NURSING MUSC ROM
[facility]) Hospitalist Group History and Physical      CHIEF COMPLAINT:  SOB    History of Present Illness:      70year old male with a past medical history of hyperlipidemia, CAD, hypertension, COPD, chronic HBV, atrial flutter, thoracic AAA, and aortitis RA presented to the ED for SOB. He reports starting yesterday he began to feel unwell. Patient reports feeling fatigued, nausea causing decreased appetite, and SOB. He reports SOB has gradually worsened since yesterday. SOB is at rest and causes him to feel like he cannot take a deep breath. He is unsure if SOB worsens with exertion because he has not been out of bed very much. SOB is associated with a productive cough. Sputum is described as bright yellow. He also reports having a 103 degree fever this morning. Unsure if he had fevers yesterday. Patient currently has a severe headache. He denies taking any medication for this. He admits to a history of PNA three times in the past. He has a history of smoking 2 packs of cigarettes for 8 years but quit in 1975. He denies any drug or alcohol abuse. No known family history due to being adopted. He reports an allergy to statin, causing leg cramps but is currently taking Lipitor. Informant(s) for H&P: Patient and EMR    REVIEW OF SYSTEMS:  A comprehensive review of systems was negative except for: what is in the HPI    PMH:  Past Medical History:   Diagnosis Date    Aneurysm artery, pulmonary ([address]     Arthritis     rheumatoid    Asthma 1974    status asthmaticus in 3/2004.  Cancer ([address]     basal cell to face    Deviated septum     S/P repair x 3.    Hepatitis B     Hypercholesterolemia     Intolerance to statins.  Hypertension     Kidney stone     Restless leg syndrome 3/2004    Negative sleep study for sleep apnea. Positive for restless leg syndrome.     Rheumatoid arthritis(714.0)        Surgical History:  Past Surgical History:   Procedure Laterality Date    ADENOIDECTOMY      CARDIAC CATHETERIZATION      CARDIAC VALVE SURGERY  06/16/2020    AVR and repair of ascending aortic aneurysm in [facility] CARDIOVASCULAR STRESS TEST  1994    Stress-induced ischemia involving the interventricular septum    CATARACT REMOVAL Bilateral 2002    COLONOSCOPY      CYST REMOVAL      DIAGNOSTIC CARDIAC CATH LAB PROCEDURE      Normal per patient (this was done by Dr. Caroline Ibarra and records are not available.)    HERNIA REPAIR Left     HERNIA REPAIR      Right luminal hernia repair.  SKIN BIOPSY  03/27/2018    Dr Kaley Chacon cheek-ulcerated basal cell carcinoma    TONSILLECTOMY      VASECTOMY         Medications Prior to Admission:    Prior to Admission medications    Medication Sig Start Date End Date Taking?  Authorizing Provider   vitamin B-12 (CYANOCOBALAMIN) 1000 MCG tablet Take 1,000 mcg by mouth daily   Yes Historical Provider, MD   albuterol (PROVENTIL) (2.5 MG/3ML) 0.083% nebulizer solution Take 3 mLs by nebulization every 6 hours as needed for Wheezing 1/27/22   Gómez Aguilar, DO   Calcium Carbonate-Vit D-Min (CALCIUM 1200 PO) Take by mouth    Historical Provider, MD   Albuterol Sulfate (PROAIR HFA IN) Inhale into the lungs    Historical Provider, MD   tamsulosin (FLOMAX) 0.4 MG capsule Take 0.4 mg by mouth daily    Historical Provider, MD   calcium carbonate 600 MG TABS tablet Take 1 tablet by mouth daily    Historical Provider, MD   mometasone-formoterol (DULERA) 200-5 MCG/ACT inhaler Inhale 2 puffs into the lungs every 12 hours    Historical Provider, MD   fluticasone (FLONASE) 50 MCG/ACT nasal spray 1 spray by Each Nostril route daily    Historical Provider, MD   Multiple Vitamins-Minerals (PRESERVISION AREDS 2+MULTI VIT PO) Take 1 capsule by mouth daily    Historical Provider, MD   acetaminophen (TYLENOL) 500 MG tablet Take 500 mg by mouth every 6 hours as needed for Pain    Historical Provider, MD   aspirin 81 MG chewable tablet Take 81 mg by mouth daily    Historical Provider, MD atorvastatin (LIPITOR) 10 MG tablet Take 10 mg by mouth nightly    Historical Provider, MD       Allergies:    Statins [statins]    Social History:    reports that he quit smoking about 46 years ago. He started smoking about 56 years ago. He has a 5.00 pack-year smoking history. He has never used smokeless tobacco. He reports that he does not drink alcohol and does not use drugs. Family History:   family history is not on file. He was adopted. PHYSICAL EXAM:  Vitals:  BP (!) 141/68   Pulse 77   Temp 98.1 °F (36.7 °C) (Oral)   Resp 16   Ht 5' 5\" (1.651 m)   Wt 147 lb (66.7 kg)   SpO2 93%   BMI 24.46 kg/m²   General Appearance: alert and oriented to person, place and time and in no acute distress  Skin: warm and dry  Head: normocephalic and atraumatic  Eyes: pupils equal, round, and reactive to light, extraocular eye movements intact, conjunctivae normal  Neck: neck supple and non tender without mass   Pulmonary/Chest: clear to auscultation bilaterally- no wheezes, rales or rhonchi, normal air movement, no respiratory distress  Cardiovascular: normal rate, normal S1 and S2 and no carotid bruits  Abdomen: soft, non-tender, non-distended, normal bowel sounds, no masses or organomegaly  Extremities: no cyanosis, no clubbing and no edema  Neurologic: no cranial nerve deficit and speech normal        LABS:  Recent Labs     04/23/22  1128      K 3.9      CO2 22   BUN 17   CREATININE 0.9   GLUCOSE 121*   CALCIUM 9.6       Recent Labs     04/23/22  1128   WBC 12.5*   RBC 4.64   HGB 14.9   HCT 45.0   MCV 97.0   MCH 32.1   MCHC 33.1   RDW 13.1   *   MPV 10.6       No results for input(s): POCGLU in the last 72 hours.     CBC:   Lab Results   Component Value Date    WBC 12.5 04/23/2022    RBC 4.64 04/23/2022    HGB 14.9 04/23/2022    HCT 45.0 04/23/2022    MCV 97.0 04/23/2022    MCH 32.1 04/23/2022    MCHC 33.1 04/23/2022    RDW 13.1 04/23/2022     04/23/2022    MPV 10.6 04/23/2022 CMP:    Lab Results   Component Value Date     04/23/2022    K 3.9 04/23/2022    K 4.6 07/08/2020     04/23/2022    CO2 22 04/23/2022    BUN 17 04/23/2022    CREATININE 0.9 04/23/2022    GFRAA >60 04/23/2022    LABGLOM >60 04/23/2022    GLUCOSE 121 04/23/2022    PROT 7.3 04/23/2022    LABALBU 4.4 04/23/2022    CALCIUM 9.6 04/23/2022    BILITOT 3.9 04/23/2022    ALKPHOS 59 04/23/2022    AST 20 04/23/2022    ALT 18 04/23/2022       Radiology:   CTA PULMONARY W CONTRAST   Final Result   No evidence of pulmonary embolism. Confluent bilateral pulmonary   opacifications in the lower lungs and minimal right middle lobe involvement   of airspace disease bronchopneumonia without pleural effusion         XR CHEST PORTABLE   Final Result   Suspect left lower lobe infiltrate. EKG:   Normal sinus rhythm  Incomplete right bundle branch block  Borderline ECG  No previous ECGs available    ASSESSMENT:      Principal Problem:    Acute respiratory failure ([address]  Resolved Problems:    * No resolved hospital problems. *      PLAN:    1. Acute respiratory failure: Patient saturating at 86% when ambulating, placed on 2 L NC. Wean as tolerated. 2. PNA: CXR showed possible left lower lobe infiltrate. CTA showing bilateral opacifications in the lower lungs and minimal right middle lobe involvement. Blood cultures taken. Pro rachel ordered. Respiratory panel ordered. Urine legionella and strep ordered. Recieved Rocephin x1 in the ED. Start IV Levaquin for 7 days. 3. CAD: S/p CABG in 2020. S/p left atrial appendage ligation 6/16/2020, aortic valve replacement and aortoplasty. Continue ASA, and Lipitor. Follows with Cardiology outpatient. 4. COPD: Continue Albuterol. Symbicort started as formulary for Symbicort. Follows with pulmonologist, Dr. Frank Mccallum outpatient. 5. Chronic HBV: Follows with GI outpatient. Does not currently meet indication for treatment.      6. Atrial flutter: Not currently on any medications. Follows with Cardiology outpatient. 7. Hx of thoracic AAA    8. Aortitis RA: Not currently on DMARDS. Follows with Rheumatology outpatient. 9. Headache: Tylenol prn. 10. Hyperlipidemia: Continue Lipitor     Code Status: Full  DVT prophylaxis: Lovenox       NOTE: This report was transcribed using voice recognition software. Every effort was made to ensure accuracy; however, inadvertent computerized transcription errors may be present.   Electronically signed by Marla Mak PA-C on 4/23/2022 at 2:26 PM full range of motion in all extremities

## 2023-04-16 NOTE — ED PROVIDER NOTE - PHYSICAL EXAMINATION
GENERAL: Awake, alert, NAD  LUNGS: CTAB, no wheezes or crackles   CARDIAC: RRR, no m/r/g  ABDOMEN: Soft, , non tender, non distended, no rebound, no guarding  BACK: No midline spinal tenderness, no CVA tenderness  EXT: No edema, no calf tenderness, 2+ DP pulses bilaterally, no deformities.  NEURO: A&Ox3. Moving all extremities. no midline spinal tenderness negative straight leg raise test b/l  full strength / sensation lower ext   SKIN: Warm and dry. No rash.  PSYCH: Normal affect.

## 2023-04-17 ENCOUNTER — APPOINTMENT (OUTPATIENT)
Dept: ORTHOPEDIC SURGERY | Facility: CLINIC | Age: 43
End: 2023-04-17
Payer: COMMERCIAL

## 2023-04-17 VITALS
HEIGHT: 68 IN | HEART RATE: 80 BPM | BODY MASS INDEX: 25.76 KG/M2 | TEMPERATURE: 97.9 F | DIASTOLIC BLOOD PRESSURE: 87 MMHG | WEIGHT: 170 LBS | OXYGEN SATURATION: 98 % | SYSTOLIC BLOOD PRESSURE: 146 MMHG

## 2023-04-17 DIAGNOSIS — M51.36 OTHER INTERVERTEBRAL DISC DEGENERATION, LUMBAR REGION: ICD-10-CM

## 2023-04-17 DIAGNOSIS — M54.16 RADICULOPATHY, LUMBAR REGION: ICD-10-CM

## 2023-04-17 PROCEDURE — 99214 OFFICE O/P EST MOD 30 MIN: CPT

## 2023-04-17 NOTE — DISCUSSION/SUMMARY
[de-identified] : We discussed further treatment options.  He appears to have a myriad of symptoms rating into his lower extremities.  I explained to him that I could not necessarily correlate all the symptoms with his neuroforaminal stenosis on the left side.  We discussed further therapy which she is currently in.  We also discussed epidural injections.  At this point he would like to be evaluated by neurology for possible EMG and nerve conduction studies.  He will let me know if any changes or worsening of his symptoms.

## 2023-04-17 NOTE — PHYSICAL EXAM
[Antalgic] : not antalgic [Ataxic] : not ataxic [de-identified] : Examination of the lumbar spine reveals no midline tenderness palpation, step-offs, or skin lesions. Decreased range of motion with respect to flexion, extension, lateral bending, and rotation. No tenderness to palpation of the sciatic notch. No tenderness palpation of the bilateral greater trochanters. No pain with passive internal/external rotation of the hips. No instability of bilateral lower extremities.  Negative ERNESTO. Negative straight leg raise bilaterally. No bowstring. Negative femoral stretch. 5 out of 5 iliopsoas, hip abductors, hips adductors, quadriceps, hamstrings, gastrocsoleus, tibialis anterior, extensor hallucis longus, peroneals. Grossly intact sensation to light touch bilateral lower extremities. 1+ patellar and Achilles reflexes. Downgoing Babinski. No clonus. Intact proprioception. Palpable pulses. No skin lesion and no edema on the right and left lower extremities. [de-identified] : Review of his lumbar spine MRI reveals mild degenerative disc disease.  He appears to have a small annular fissure on the left at L4-5 with some neuroforaminal stenosis on that side.

## 2023-04-21 ENCOUNTER — APPOINTMENT (OUTPATIENT)
Dept: INTERNAL MEDICINE | Facility: CLINIC | Age: 43
End: 2023-04-21
Payer: COMMERCIAL

## 2023-04-21 ENCOUNTER — NON-APPOINTMENT (OUTPATIENT)
Age: 43
End: 2023-04-21

## 2023-04-21 ENCOUNTER — APPOINTMENT (OUTPATIENT)
Dept: NEUROLOGY | Facility: CLINIC | Age: 43
End: 2023-04-21
Payer: COMMERCIAL

## 2023-04-21 VITALS — HEART RATE: 86 BPM | SYSTOLIC BLOOD PRESSURE: 133 MMHG | DIASTOLIC BLOOD PRESSURE: 89 MMHG

## 2023-04-21 DIAGNOSIS — M79.18 MYALGIA, OTHER SITE: ICD-10-CM

## 2023-04-21 DIAGNOSIS — H93.12 TINNITUS, LEFT EAR: ICD-10-CM

## 2023-04-21 DIAGNOSIS — R20.9 UNSPECIFIED DISTURBANCES OF SKIN SENSATION: ICD-10-CM

## 2023-04-21 PROCEDURE — 99204 OFFICE O/P NEW MOD 45 MIN: CPT

## 2023-04-21 PROCEDURE — 96372 THER/PROPH/DIAG INJ SC/IM: CPT

## 2023-04-21 RX ORDER — CYANOCOBALAMIN 1000 UG/ML
1000 INJECTION INTRAMUSCULAR; SUBCUTANEOUS
Qty: 0 | Refills: 0 | Status: COMPLETED | OUTPATIENT
Start: 2023-04-21

## 2023-04-21 RX ADMIN — CYANOCOBALAMIN 1000 MCG/ML: 1000 INJECTION, SOLUTION INTRAMUSCULAR at 00:00

## 2023-04-21 NOTE — PHYSICAL EXAM
[FreeTextEntry1] : Constitutional:  Patient was well-developed, well-nourished and in no acute distress. \par \par Head:  Normocephalic, atraumatic. Tympanic membranes were clear. \par \par Neck:  Supple with full range of motion. \par \par Cardiovascular:  Cardiac rhythm was regular without murmur. There were no carotid bruits. Peripheral pulses were full and symmetric. \par \par Respiratory:  Lungs were clear. \par \par Abdomen:  Soft and nontender. \par \par Spine:  Nontender. \par \par Skin:  There were no rashes. \par \par NEUROLOGICAL EXAMINATION:\par \par Mental Status: Patient was alert and oriented. Speech was fluent. There was no dysarthria. \par \par Cranial Nerves: \par \par II: Visual acuity was 20/25-2 in the right eye and 20/20 in the left eye with near card. Pupils were equal and reactive. Visual fields were full. Funduscopic examination was normal. \par \par III, IV, VI:  Eye movements were full without nystagmus. \par \par V: Facial sensation was intact. \par \par VII: Facial strength was normal. \par \par VIII: Hearing was equal. \par \par IX, X: Palatal movement was normal. Phonation was normal. \par \par XI: Sternocleidomastoids and trapezii were normal. \par \par XII: Tongue was midline and movements normal. There was no lingual atrophy or fasciculations. \par \par Motor Examination: Muscle bulk, tone and strength were normal.  There was no tremor.\par \par Sensory Examination: Pinprick, vibration and joint position sense were intact. \par \par Reflexes: DTRs were 2+ throughout except for the right biceps and brachioradialis and both ankle jerks which were 2.\par \par Plantar Responses: Plantar responses were flexor. \par \par Coordination/Cerebellar Function: There was no dysmetria on finger to nose or heel to shin testing. \par \par Gait/Stance: Gait and tandem were normal. Romberg was negative.\par
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
## 2023-04-21 NOTE — CONSULT LETTER
[Dear  ___] : Dear  [unfilled], [Consult Letter:] : I had the pleasure of evaluating your patient, [unfilled]. [Please see my note below.] : Please see my note below. [Consult Closing:] : Thank you very much for allowing me to participate in the care of this patient.  If you have any questions, please do not hesitate to contact me. [Sincerely,] : Sincerely, [FreeTextEntry3] : Duran Oakley MD\par  [DrBreana  ___] : Dr. HERNANDEZ

## 2023-04-21 NOTE — ASSESSMENT
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  He was treated with an aggressive therapy regimen which may have aggravated his symptoms.  He has a history of joint laxity which might be due to a underlying connective tissue disorder.  It is unclear whether his symptoms are neuropathic in etiology, central and/or peripheral.\par \par I will review a recent serologic evaluation.  He will retrieve past imaging studies from Dr. Temple's office for my review as well as his medical records.  If indicated, updated imaging of the brain, cervical and thoracic spine will be ordered.  Further management will depend upon these results and his clinical course.

## 2023-04-28 ENCOUNTER — APPOINTMENT (OUTPATIENT)
Dept: INTERNAL MEDICINE | Facility: CLINIC | Age: 43
End: 2023-04-28
Payer: COMMERCIAL

## 2023-04-28 PROCEDURE — 96372 THER/PROPH/DIAG INJ SC/IM: CPT

## 2023-04-28 RX ORDER — CYANOCOBALAMIN 1000 UG/ML
1000 INJECTION INTRAMUSCULAR; SUBCUTANEOUS
Qty: 0 | Refills: 0 | Status: COMPLETED | OUTPATIENT
Start: 2023-04-28

## 2023-04-28 RX ADMIN — CYANOCOBALAMIN 1000 MCG/ML: 1000 INJECTION INTRAMUSCULAR; SUBCUTANEOUS at 00:00

## 2023-05-30 ENCOUNTER — NON-APPOINTMENT (OUTPATIENT)
Age: 43
End: 2023-05-30

## 2023-06-01 ENCOUNTER — TRANSCRIPTION ENCOUNTER (OUTPATIENT)
Age: 43
End: 2023-06-01

## 2023-06-02 LAB
ALP BLD-CCNC: 63 U/L
ALT SERPL-CCNC: 27 U/L
AST SERPL-CCNC: 19 U/L
VIT B12 SERPL-MCNC: 1380 PG/ML
VIT B6 SERPL-MCNC: 13 UG/L

## 2023-06-28 ENCOUNTER — APPOINTMENT (OUTPATIENT)
Dept: OTOLARYNGOLOGY | Facility: CLINIC | Age: 43
End: 2023-06-28
Payer: COMMERCIAL

## 2023-06-28 VITALS
BODY MASS INDEX: 25.76 KG/M2 | SYSTOLIC BLOOD PRESSURE: 134 MMHG | HEART RATE: 76 BPM | WEIGHT: 170 LBS | HEIGHT: 68 IN | DIASTOLIC BLOOD PRESSURE: 87 MMHG

## 2023-06-28 DIAGNOSIS — R03.0 ELEVATED BLOOD-PRESSURE READING, W/OUT DIAGNOSIS OF HYPERTENSION: ICD-10-CM

## 2023-06-28 DIAGNOSIS — T70.0XXA OTITIC BAROTRAUMA, INITIAL ENCOUNTER: ICD-10-CM

## 2023-06-28 PROCEDURE — 99213 OFFICE O/P EST LOW 20 MIN: CPT

## 2023-06-28 NOTE — HISTORY OF PRESENT ILLNESS
[de-identified] : 41 yo\par PAtient complains of clogged right ear since yesterday s/p flight. He used Afrin before he flew. Today he notes that clogging has improved. Pt has no ear pain, ear drainage, tinnitus, vertigo, nasal congestion. \par \par   [Hearing Loss] : no hearing loss [Vertigo] : no vertigo [Otalgia] : no otalgia [Nasal Congestion] : no nasal congestion [Sore Throat] : no sore throat [Neck Mass] : no neck mass [Cough] : no cough

## 2023-06-28 NOTE — END OF VISIT
[FreeTextEntry3] : I personally saw and examined LORENZO JANE in detail. I spoke to GUSTAVO Iniguez regarding the assessment and plan of care. I reviewed the above assessment and plan of care, and agree. I have made changes in changes in the body of the note where appropriate.I personally reviewed the HPI, PMH, FH, SH, ROS and medications/allergies. I have spoken to GUSTAVO Iniguez regarding the history and have personally determined the assessment and plan of care, and documented this myself. I was present and participated in all key portions of the encounter and all procedures noted above. I have made changes in the body of the note where appropriate.\par \par Attesting Faculty: See Attending Signature Below \par \par \par
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
## 2023-07-10 ENCOUNTER — APPOINTMENT (OUTPATIENT)
Dept: ORTHOPEDIC SURGERY | Facility: CLINIC | Age: 43
End: 2023-07-10
Payer: COMMERCIAL

## 2023-07-10 VITALS — HEIGHT: 68 IN | BODY MASS INDEX: 25.76 KG/M2 | WEIGHT: 170 LBS

## 2023-07-10 DIAGNOSIS — S90.31XA CONTUSION OF RIGHT FOOT, INITIAL ENCOUNTER: ICD-10-CM

## 2023-07-10 PROCEDURE — 99213 OFFICE O/P EST LOW 20 MIN: CPT

## 2023-07-10 NOTE — DATA REVIEWED
[Outside X-rays] : outside x-rays [Right] : of the right [Foot] : foot [I reviewed the films/CD and agree] : I reviewed the films/CD and agree
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
## 2023-07-10 NOTE — ASSESSMENT
[FreeTextEntry1] : Outside xrays reviewed - no fractures seen\par Discussed ice and advil\par patient refused physical exam today due to concern for increased pain during examination and previous foot injury\par WBAT\par Follow up with foot/ankle in 1 week

## 2023-07-12 ENCOUNTER — APPOINTMENT (OUTPATIENT)
Dept: ORTHOPEDIC SURGERY | Facility: CLINIC | Age: 43
End: 2023-07-12
Payer: COMMERCIAL

## 2023-07-12 VITALS — BODY MASS INDEX: 25.76 KG/M2 | WEIGHT: 170 LBS | HEIGHT: 68 IN

## 2023-07-12 DIAGNOSIS — S90.31XA CONTUSION OF RIGHT FOOT, INITIAL ENCOUNTER: ICD-10-CM

## 2023-07-12 PROCEDURE — 99214 OFFICE O/P EST MOD 30 MIN: CPT

## 2023-07-12 NOTE — PHYSICAL EXAM
[Right] : right foot and ankle [NL 30)] : inversion 30 degrees [NL (40)] : MTP joint DF 40 degrees [NL (20)] : MTP joint PF 20 degrees [5___] : [facility] 5[unfilled]/5 [2+] : dorsalis pedis pulse: 2+ [] : patient ambulates without assistive device [FreeTextEntry8] : no sig pain on exam

## 2023-07-12 NOTE — HISTORY OF PRESENT ILLNESS
[Dull/Aching] : dull/aching [2] : 2 [de-identified] : 07/12/2023: 5 days foot pain from a phone falling on his foot. went to urgent care and had xrays done. walking in regular shoes. denies dm/tob.  [] : Post Surgical Visit: no [FreeTextEntry1] : RT foot

## 2023-07-12 NOTE — DATA REVIEWED
[Outside X-rays] : outside x-rays [Right] : of the right [Foot] : foot [I reviewed the films/CD and additionally noted] : I reviewed the films/CD and additionally noted [FreeTextEntry1] : no acute fx

## 2023-07-14 ENCOUNTER — APPOINTMENT (OUTPATIENT)
Dept: ORTHOPEDIC SURGERY | Facility: CLINIC | Age: 43
End: 2023-07-14

## 2023-07-28 ENCOUNTER — APPOINTMENT (OUTPATIENT)
Dept: ORTHOPEDIC SURGERY | Facility: CLINIC | Age: 43
End: 2023-07-28
Payer: COMMERCIAL

## 2023-07-28 ENCOUNTER — APPOINTMENT (OUTPATIENT)
Dept: ORTHOPEDIC SURGERY | Facility: CLINIC | Age: 43
End: 2023-07-28

## 2023-07-28 VITALS — HEIGHT: 68 IN | WEIGHT: 170 LBS | BODY MASS INDEX: 25.76 KG/M2

## 2023-07-28 DIAGNOSIS — M25.562 PAIN IN LEFT KNEE: ICD-10-CM

## 2023-07-28 PROCEDURE — 99212 OFFICE O/P EST SF 10 MIN: CPT

## 2023-07-28 PROCEDURE — 99213 OFFICE O/P EST LOW 20 MIN: CPT

## 2023-07-28 NOTE — HISTORY OF PRESENT ILLNESS
[4] : 4 [Dull/Aching] : dull/aching [Intermittent] : intermittent [Household chores] : household chores [Leisure] : leisure [Nothing helps with pain getting better] : Nothing helps with pain getting better [Standing] : standing [Walking] : walking [] : no [FreeTextEntry1] : bilateral knees  68.1 68

## 2023-07-28 NOTE — REASON FOR VISIT
[Parent] : parent [FreeTextEntry2] : Patient is here for bilateral knee pain. Was under the care of Dr. Whitaker in 1/2023 for b/l PFS. Given a PT, but never went. Notes 2 days ago, was walking, slipped and twisted right knee. Denies popping sensation/swelling. Intermittent tingling. Pain began a few hours after. Pain is mainly lateral, occasionally medial. Right knee is worse than left. Notices left knee pain with prolonged activity. No prior injury to either. No recent treatment.

## 2023-07-30 ENCOUNTER — APPOINTMENT (OUTPATIENT)
Dept: ORTHOPEDIC SURGERY | Facility: CLINIC | Age: 43
End: 2023-07-30
Payer: COMMERCIAL

## 2023-07-30 ENCOUNTER — RESULT CHARGE (OUTPATIENT)
Age: 43
End: 2023-07-30

## 2023-07-30 VITALS — WEIGHT: 170 LBS | BODY MASS INDEX: 25.76 KG/M2 | HEIGHT: 68 IN

## 2023-07-30 DIAGNOSIS — M79.646 PAIN IN UNSPECIFIED FINGER(S): ICD-10-CM

## 2023-07-30 PROCEDURE — 73140 X-RAY EXAM OF FINGER(S): CPT | Mod: LT

## 2023-07-30 PROCEDURE — 99203 OFFICE O/P NEW LOW 30 MIN: CPT | Mod: 25

## 2023-07-30 PROCEDURE — 99213 OFFICE O/P EST LOW 20 MIN: CPT | Mod: 25

## 2023-07-30 PROCEDURE — 29280 STRAPPING OF HAND OR FINGER: CPT | Mod: LT

## 2023-07-30 NOTE — HISTORY OF PRESENT ILLNESS
[Left Arm] : left arm [1] : 2 [Sharp] : sharp [Occasional] : occasional [de-identified] : 7/30/23: pt is a 41 y/o male with injury to left index finger. pt states that his finger bent awkwardly after getting caught on his pants pocket. injury occurred 2 days ago. pt says that pain got better after the first day, but today he feels a sharp pain. pt c/o of swelling and bruising. pt states that pain is not radiating, it is just in his finger.   Allergies: NKDA [] : no [FreeTextEntry1] : index finger  [de-identified] : none

## 2023-07-30 NOTE — ASSESSMENT
[FreeTextEntry1] : The patient was advised of the diagnosis. The natural history of the pathology was explained in full to the patient in layman's terms. All questions were answered. The risks and benefits of surgical and non-surgical treatment alternatives were explained in full to the patient.  Pain seems to be resolving. pt provided reassurance. otc nsaid prn discomfort Provided dorie loop for comfort x 2weeks. RTO in 3 weeks if discomfort has not resolved.

## 2023-07-30 NOTE — IMAGING
[de-identified] : left index finger with no swelling currently no ttp is noted. MCP, PIP , DIP joints with no ligamentous laxity noted.  There is no evidence of infection. Full rom is noted with 5/5 strength. Left wrist with full and pain free ROM.

## 2023-07-31 ENCOUNTER — APPOINTMENT (OUTPATIENT)
Dept: ORTHOPEDIC SURGERY | Facility: CLINIC | Age: 43
End: 2023-07-31

## 2023-08-04 ENCOUNTER — APPOINTMENT (OUTPATIENT)
Dept: ORTHOPEDIC SURGERY | Facility: CLINIC | Age: 43
End: 2023-08-04
Payer: COMMERCIAL

## 2023-08-04 VITALS — WEIGHT: 170 LBS | BODY MASS INDEX: 25.76 KG/M2 | HEIGHT: 68 IN

## 2023-08-04 DIAGNOSIS — S69.90XA UNSPECIFIED INJURY OF UNSPECIFIED WRIST, HAND AND FINGER(S), INITIAL ENCOUNTER: ICD-10-CM

## 2023-08-04 PROCEDURE — 99213 OFFICE O/P EST LOW 20 MIN: CPT

## 2023-08-04 NOTE — IMAGING
[de-identified] : Left index finger with mild swelling, +ttp at RCL of DIP. Able to flex and extend at MCP, PIP and DIP. Sensation intact throughout. <2sec cap refill.  Left index finger radiographs with no fracture nor dislocation.

## 2023-08-04 NOTE — HISTORY OF PRESENT ILLNESS
[de-identified] : Age: 42M No pertinent PMHx Hand Dominance: RHD Chief Complaint: left index finger pain onset 07/26/23 s/p trauma. Patient reports that he was pulling his hand out of his pocket when his index finger got caught on his pocket and turned awkwardly. Patient reports pain is mild and intermittent. Patient reports pain on the distal aspect of his index finger when he wakes up, lasting for about 15 minutes before self-resolving. Denies numbness/tingling. 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  Trauma: yes Outside Imaging/Treatment: X-ray on 07/30/23 at an [facility] site OTC Medications: none  OT/PT: none  Bracing: had coban dorie tape, but not using it Pain worse with: making a fist Pain better with: rest

## 2023-08-04 NOTE — ASSESSMENT
[FreeTextEntry1] : Left index finger DIP RCL sprain - reviewed radiographs and overall pathoanatomy with patient. Discussed management to consist of NSAIDs prn, WBAT, no splint.  F/u prn

## 2023-09-08 ENCOUNTER — APPOINTMENT (OUTPATIENT)
Dept: ORTHOPEDIC SURGERY | Facility: CLINIC | Age: 43
End: 2023-09-08
Payer: COMMERCIAL

## 2023-09-08 VITALS — HEIGHT: 68 IN | BODY MASS INDEX: 25.76 KG/M2 | WEIGHT: 170 LBS

## 2023-09-08 PROCEDURE — 99213 OFFICE O/P EST LOW 20 MIN: CPT

## 2023-09-08 RX ORDER — METHYLPREDNISOLONE 4 MG/1
4 TABLET ORAL
Qty: 21 | Refills: 0 | Status: COMPLETED | COMMUNITY
Start: 2023-02-26 | End: 2023-09-08

## 2023-09-08 RX ORDER — PENTOXIFYLLINE 400 MG/1
400 TABLET, EXTENDED RELEASE ORAL 3 TIMES DAILY
Qty: 60 | Refills: 0 | Status: COMPLETED | COMMUNITY
Start: 2023-03-02 | End: 2023-09-08

## 2023-09-08 RX ORDER — PREDNISONE 20 MG/1
20 TABLET ORAL
Qty: 9 | Refills: 0 | Status: COMPLETED | COMMUNITY
Start: 2022-10-07 | End: 2023-09-08

## 2023-09-08 NOTE — PHYSICAL EXAM
[Bilateral] : knee bilaterally [5___] : quadriceps 5[unfilled]/5 [Negative] : negative Ulices's [] : non-antalgic [FreeTextEntry8] : slight tenderness anteromedial joint line right knee

## 2023-09-08 NOTE — REASON FOR VISIT
[Parent] : parent [FreeTextEntry2] : Patient complains of continued mild pain B Knees. R Greater than L Knee. Accompanied by parent. Pain is not constant, never any buckling or locking or swelling. Able to walk and go to the beach. Never any injury. Symptoms are vague and not consistent in location, timing or activity.

## 2023-09-08 NOTE — ASSESSMENT
[FreeTextEntry1] : because of his persistence, I ordered an MRI right knee and recommend PT for PFS. No restrictions with activity.

## 2023-09-08 NOTE — HISTORY OF PRESENT ILLNESS
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

## 2023-09-15 ENCOUNTER — APPOINTMENT (OUTPATIENT)
Dept: ORTHOPEDIC SURGERY | Facility: CLINIC | Age: 43
End: 2023-09-15
Payer: COMMERCIAL

## 2023-09-15 VITALS — BODY MASS INDEX: 25.76 KG/M2 | WEIGHT: 170 LBS | HEIGHT: 68 IN

## 2023-09-15 DIAGNOSIS — M22.2X1 PATELLOFEMORAL DISORDERS, RIGHT KNEE: ICD-10-CM

## 2023-09-15 DIAGNOSIS — M22.2X2 PATELLOFEMORAL DISORDERS, RIGHT KNEE: ICD-10-CM

## 2023-09-15 PROCEDURE — 99213 OFFICE O/P EST LOW 20 MIN: CPT

## 2023-09-22 ENCOUNTER — APPOINTMENT (OUTPATIENT)
Dept: ORTHOPEDIC SURGERY | Facility: CLINIC | Age: 43
End: 2023-09-22

## 2023-09-22 ENCOUNTER — APPOINTMENT (OUTPATIENT)
Dept: ORTHOPEDIC SURGERY | Facility: CLINIC | Age: 43
End: 2023-09-22
Payer: COMMERCIAL

## 2023-09-22 VITALS — WEIGHT: 170 LBS | BODY MASS INDEX: 25.76 KG/M2 | HEIGHT: 68 IN

## 2023-09-22 PROCEDURE — 99213 OFFICE O/P EST LOW 20 MIN: CPT

## 2023-09-22 PROCEDURE — 99203 OFFICE O/P NEW LOW 30 MIN: CPT

## 2023-09-23 ENCOUNTER — APPOINTMENT (OUTPATIENT)
Dept: ORTHOPEDIC SURGERY | Facility: CLINIC | Age: 43
End: 2023-09-23
Payer: COMMERCIAL

## 2023-09-23 VITALS — WEIGHT: 170 LBS | HEIGHT: 68 IN | BODY MASS INDEX: 25.76 KG/M2

## 2023-09-23 PROCEDURE — 99203 OFFICE O/P NEW LOW 30 MIN: CPT

## 2023-09-23 PROCEDURE — 99213 OFFICE O/P EST LOW 20 MIN: CPT

## 2023-09-29 ENCOUNTER — APPOINTMENT (OUTPATIENT)
Dept: ORTHOPEDIC SURGERY | Facility: CLINIC | Age: 43
End: 2023-09-29

## 2023-10-11 ENCOUNTER — APPOINTMENT (OUTPATIENT)
Dept: ORTHOPEDIC SURGERY | Facility: CLINIC | Age: 43
End: 2023-10-11
Payer: COMMERCIAL

## 2023-10-11 DIAGNOSIS — M25.571 PAIN IN RIGHT ANKLE AND JOINTS OF RIGHT FOOT: ICD-10-CM

## 2023-10-11 PROCEDURE — 99213 OFFICE O/P EST LOW 20 MIN: CPT

## 2023-10-20 ENCOUNTER — APPOINTMENT (OUTPATIENT)
Dept: ORTHOPEDIC SURGERY | Facility: CLINIC | Age: 43
End: 2023-10-20

## 2023-10-27 ENCOUNTER — APPOINTMENT (OUTPATIENT)
Dept: ORTHOPEDIC SURGERY | Facility: CLINIC | Age: 43
End: 2023-10-27
Payer: COMMERCIAL

## 2023-10-27 ENCOUNTER — APPOINTMENT (OUTPATIENT)
Dept: ORTHOPEDIC SURGERY | Facility: CLINIC | Age: 43
End: 2023-10-27

## 2023-10-27 VITALS — HEIGHT: 68 IN | WEIGHT: 170 LBS | BODY MASS INDEX: 25.76 KG/M2

## 2023-10-27 DIAGNOSIS — S46.911A STRAIN OF UNSPECIFIED MUSCLE, FASCIA AND TENDON AT SHOULDER AND UPPER ARM LEVEL, RIGHT ARM, INITIAL ENCOUNTER: ICD-10-CM

## 2023-10-27 DIAGNOSIS — S46.919D: ICD-10-CM

## 2023-10-27 DIAGNOSIS — M75.41 IMPINGEMENT SYNDROME OF RIGHT SHOULDER: ICD-10-CM

## 2023-10-27 PROCEDURE — 73010 X-RAY EXAM OF SHOULDER BLADE: CPT | Mod: RT

## 2023-10-27 PROCEDURE — 73030 X-RAY EXAM OF SHOULDER: CPT | Mod: RT

## 2023-11-12 ENCOUNTER — APPOINTMENT (OUTPATIENT)
Dept: ORTHOPEDIC SURGERY | Facility: CLINIC | Age: 43
End: 2023-11-12
Payer: COMMERCIAL

## 2023-11-12 VITALS — HEIGHT: 68 IN | BODY MASS INDEX: 25.76 KG/M2 | WEIGHT: 170 LBS

## 2023-11-12 DIAGNOSIS — R22.31 LOCALIZED SWELLING, MASS AND LUMP, RIGHT UPPER LIMB: ICD-10-CM

## 2023-11-12 DIAGNOSIS — R20.2 PARESTHESIA OF SKIN: ICD-10-CM

## 2023-11-12 PROCEDURE — 99214 OFFICE O/P EST MOD 30 MIN: CPT

## 2023-11-12 RX ORDER — METHYLPREDNISOLONE 4 MG/1
4 TABLET ORAL
Qty: 1 | Refills: 1 | Status: ACTIVE | COMMUNITY
Start: 2023-11-12 | End: 1900-01-01

## 2023-11-13 PROBLEM — R20.2 TINGLING OF UPPER EXTREMITY: Status: ACTIVE | Noted: 2023-11-13

## 2023-11-17 ENCOUNTER — APPOINTMENT (OUTPATIENT)
Dept: ORTHOPEDIC SURGERY | Facility: CLINIC | Age: 43
End: 2023-11-17
Payer: COMMERCIAL

## 2023-11-17 VITALS — BODY MASS INDEX: 25.76 KG/M2 | WEIGHT: 170 LBS | HEIGHT: 68 IN

## 2023-11-17 PROCEDURE — 99212 OFFICE O/P EST SF 10 MIN: CPT

## 2023-12-01 ENCOUNTER — APPOINTMENT (OUTPATIENT)
Dept: NEUROLOGY | Facility: CLINIC | Age: 43
End: 2023-12-01
Payer: COMMERCIAL

## 2023-12-01 VITALS
WEIGHT: 170 LBS | SYSTOLIC BLOOD PRESSURE: 150 MMHG | HEIGHT: 68 IN | BODY MASS INDEX: 25.76 KG/M2 | DIASTOLIC BLOOD PRESSURE: 90 MMHG | HEART RATE: 100 BPM

## 2023-12-01 DIAGNOSIS — R20.2 ANESTHESIA OF SKIN: ICD-10-CM

## 2023-12-01 DIAGNOSIS — R20.0 ANESTHESIA OF SKIN: ICD-10-CM

## 2023-12-01 PROCEDURE — 99214 OFFICE O/P EST MOD 30 MIN: CPT

## 2023-12-15 ENCOUNTER — APPOINTMENT (OUTPATIENT)
Dept: ORTHOPEDIC SURGERY | Facility: CLINIC | Age: 43
End: 2023-12-15

## 2023-12-17 ENCOUNTER — APPOINTMENT (OUTPATIENT)
Dept: ORTHOPEDIC SURGERY | Facility: CLINIC | Age: 43
End: 2023-12-17

## 2023-12-17 ENCOUNTER — APPOINTMENT (OUTPATIENT)
Dept: ORTHOPEDIC SURGERY | Facility: CLINIC | Age: 43
End: 2023-12-17
Payer: COMMERCIAL

## 2023-12-17 VITALS — HEIGHT: 68 IN | BODY MASS INDEX: 25.76 KG/M2 | WEIGHT: 170 LBS

## 2023-12-17 DIAGNOSIS — G56.00 CARPAL TUNNEL SYNDROME, UNSPECIFIED UPPER LIMB: ICD-10-CM

## 2023-12-17 DIAGNOSIS — M70.21 OLECRANON BURSITIS, RIGHT ELBOW: ICD-10-CM

## 2023-12-17 PROCEDURE — 99213 OFFICE O/P EST LOW 20 MIN: CPT

## 2023-12-17 PROCEDURE — 99203 OFFICE O/P NEW LOW 30 MIN: CPT

## 2023-12-17 NOTE — IMAGING
[de-identified] : No warmth, no ecchymosis Bursal collection noted No Tenderness to palpation over elbow Full elbow flexion, extension, supination, pronation 5/5 strength in flexion, extension, supination, pronation No Varus or Valgus instability Motor and sensory intact distally +TINEL ELBOW AND WRIST, +PHALEN Decreased sens light touch globally all digits

## 2023-12-17 NOTE — HISTORY OF PRESENT ILLNESS
[8] : 8 [7] : 7 [de-identified] : 12/17/23 -  pt is a 44 yo m here for eval of rt elbow  rt elbow pain for few months  [FreeTextEntry1] : rt elbow

## 2023-12-17 NOTE — ASSESSMENT
[FreeTextEntry1] : NEW ONSET MINIMAL BURSAL COLLECTION, NOT ENOUGH TO ASPIRATE TODAY. DEFERS XRAYS COMPRESSION AND ICE ADVISED ALSO EXACERBATED CHRONIC CTS AND CUTS; ALREADY ESTABLISEHD WITH OUR HAND TEAM FOR THIS; ADVISED HE FOLLOW UP WITH TREATING ARIAS CASAS

## 2024-01-12 ENCOUNTER — APPOINTMENT (OUTPATIENT)
Dept: ORTHOPEDIC SURGERY | Facility: CLINIC | Age: 44
End: 2024-01-12
Payer: COMMERCIAL

## 2024-01-12 DIAGNOSIS — G56.21 LESION OF ULNAR NERVE, RIGHT UPPER LIMB: ICD-10-CM

## 2024-01-12 PROCEDURE — 99213 OFFICE O/P EST LOW 20 MIN: CPT

## 2024-01-12 NOTE — DISCUSSION/SUMMARY
[de-identified] : Discussed the nature of the diagnosis and risk and benefits of different modalities of treatment.  Improving in regard to cubital tunnel syndrome.  If symptoms are worsening or persistent into bilateral hands, he will follow up with spine specialist.  Continue avoiding prolonged flexion.  Continue with sleep modifications.  PRN

## 2024-01-12 NOTE — HISTORY OF PRESENT ILLNESS
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 He has been avoiding prolonged flexion for the past 3.5 months noting improvement. Has intermittent numbness into bilateral hands with activity. Reports recent incident while opening a door without numbness or tingling. No injury/trauma. 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

## 2024-01-12 NOTE — PHYSICAL EXAM
[Right] : right hand [] : negative Bang's [FreeTextEntry3] : 2 x 1 dorsal wrist mass, consistent with ganglion  Thumb is UCL and RCL stable [de-identified] : FDI: 5/5. FDP to 4 and 5: 5/5

## 2024-02-16 DIAGNOSIS — M25.572 PAIN IN LEFT ANKLE AND JOINTS OF LEFT FOOT: ICD-10-CM

## 2024-02-21 ENCOUNTER — APPOINTMENT (OUTPATIENT)
Dept: ORTHOPEDIC SURGERY | Facility: CLINIC | Age: 44
End: 2024-02-21
Payer: COMMERCIAL

## 2024-02-21 DIAGNOSIS — S93.402A SPRAIN OF UNSPECIFIED LIGAMENT OF LEFT ANKLE, INITIAL ENCOUNTER: ICD-10-CM

## 2024-02-21 PROCEDURE — 99212 OFFICE O/P EST SF 10 MIN: CPT | Mod: 25

## 2024-02-21 PROCEDURE — 73600 X-RAY EXAM OF ANKLE: CPT | Mod: LT

## 2024-02-21 PROCEDURE — 73630 X-RAY EXAM OF FOOT: CPT | Mod: LT

## 2024-03-06 ENCOUNTER — APPOINTMENT (OUTPATIENT)
Dept: ORTHOPEDIC SURGERY | Facility: CLINIC | Age: 44
End: 2024-03-06
Payer: COMMERCIAL

## 2024-03-06 DIAGNOSIS — S93.492A SPRAIN OF OTHER LIGAMENT OF LEFT ANKLE, INITIAL ENCOUNTER: ICD-10-CM

## 2024-03-06 PROCEDURE — 99213 OFFICE O/P EST LOW 20 MIN: CPT

## 2024-03-06 NOTE — PHYSICAL EXAM
[Left] : left foot and ankle [NL (40)] : plantar flexion 40 degrees [NL 30)] : inversion 30 degrees [NL (20)] : eversion 20 degrees [5___] : eversion 5[unfilled]/5 [2+] : dorsalis pedis pulse: 2+ [] : patient ambulates without assistive device [FreeTextEntry8] : mild med ankle joint ttp [TWNoteComboBox7] : dorsiflexion 15 degrees

## 2024-03-06 NOTE — HISTORY OF PRESENT ILLNESS
[de-identified] : 03/06/2024:  inversion injury 1 month ago w/ ankle pain. saw dr cordova who dx sprain. no interval treatment. no sig pain today  [] : no [de-identified] : LAURITA Soni [de-identified] : xray

## 2024-03-22 ENCOUNTER — APPOINTMENT (OUTPATIENT)
Dept: ORTHOPEDIC SURGERY | Facility: CLINIC | Age: 44
End: 2024-03-22
Payer: COMMERCIAL

## 2024-03-22 PROCEDURE — 99213 OFFICE O/P EST LOW 20 MIN: CPT

## 2024-03-22 PROCEDURE — 73562 X-RAY EXAM OF KNEE 3: CPT | Mod: RT

## 2024-03-22 NOTE — HISTORY OF PRESENT ILLNESS
[Sudden] : sudden [Right Leg] : right leg [de-identified] : 9/15/23:  bilateral knee pain. on and off again. 3/22/24:   pain mostly one but got hit with stuffed animal medial knee.  [2] : 2 [Dull/Aching] : dull/aching [Localized] : localized [Throbbing] : throbbing [Constant] : constant [Nothing helps with pain getting better] : Nothing helps with pain getting better [Standing] : standing [Walking] : walking [Stairs] : stairs [] : This patient has had an injection before: no [FreeTextEntry1] : bilateral knees.  [FreeTextEntry5] : Pt was initially impacted by a stuffed animal on thew medial side of his right knee and has been having pain and bruising for the last 2 days  [de-identified] : none

## 2024-03-22 NOTE — PHYSICAL EXAM
[NL (0)] : extension 0 degrees [5___] : hamstring 5[unfilled]/5 [Right] : right knee [] : light touch is intact throughout [There are no fractures, subluxations or dislocations. No significant abnormalities are seen] : There are no fractures, subluxations or dislocations. No significant abnormalities are seen [TWNoteComboBox7] : flexion 135 degrees

## 2024-03-22 NOTE — ASSESSMENT
[FreeTextEntry1] : new onset medial right knee pain with he got hit with relatively firm stuffed animal. continued medial right knee pain.  may have twisted knee at time.   possible mmt.  history of bilateral knee pain.  likely pf.  pain had mostly gone away.

## 2024-05-08 ENCOUNTER — APPOINTMENT (OUTPATIENT)
Dept: ORTHOPEDIC SURGERY | Facility: CLINIC | Age: 44
End: 2024-05-08
Payer: COMMERCIAL

## 2024-05-08 VITALS — HEIGHT: 68 IN | WEIGHT: 160 LBS | BODY MASS INDEX: 24.25 KG/M2

## 2024-05-08 DIAGNOSIS — S83.91XD SPRAIN OF UNSPECIFIED SITE OF RIGHT KNEE, SUBSEQUENT ENCOUNTER: ICD-10-CM

## 2024-05-08 PROCEDURE — 99213 OFFICE O/P EST LOW 20 MIN: CPT

## 2024-05-08 PROCEDURE — 99214 OFFICE O/P EST MOD 30 MIN: CPT

## 2024-05-08 NOTE — HISTORY OF PRESENT ILLNESS
[3] : 3 [Dull/Aching] : dull/aching [Localized] : localized [Constant] : constant [Rest] : rest [Walking] : walking [Full time] : Work status: full time [de-identified] : 9/15/23:  bilateral knee pain. on and off again. 3/22/24:   pain mostly one but got hit with stuffed animal medial knee. 5/8/24:  back to review mri. [Right Leg] : right leg [Sudden] : sudden [2] : 2 [Throbbing] : throbbing [Nothing helps with pain getting better] : Nothing helps with pain getting better [Standing] : standing [Stairs] : stairs [] : This patient has had an injection before: no [FreeTextEntry1] : bilateral knees.  [FreeTextEntry5] : Pt was initially impacted by a stuffed animal on thew medial side of his right knee and has been having pain and bruising for the last 2 days  [de-identified] : MRI @ [facility]  [de-identified] : none

## 2024-05-08 NOTE — ASSESSMENT
[FreeTextEntry1] : new onset medial right knee pain with he got hit with relatively firm stuffed animal. mri shows mild quad strain. very mild chodral injury trochlea.  history of bilateral knee pain.  likely pf.  pain had mostly gone away.

## 2024-05-08 NOTE — PHYSICAL EXAM
[Right] : right knee [NL (0)] : extension 0 degrees [5___] : hamstring 5[unfilled]/5 [] : light touch is intact throughout [TWNoteComboBox7] : flexion 135 degrees

## 2024-05-10 ENCOUNTER — APPOINTMENT (OUTPATIENT)
Dept: ORTHOPEDIC SURGERY | Facility: CLINIC | Age: 44
End: 2024-05-10

## 2024-05-24 ENCOUNTER — APPOINTMENT (OUTPATIENT)
Dept: ORTHOPEDIC SURGERY | Facility: CLINIC | Age: 44
End: 2024-05-24

## 2024-05-28 ENCOUNTER — APPOINTMENT (OUTPATIENT)
Dept: ORTHOPEDIC SURGERY | Facility: CLINIC | Age: 44
End: 2024-05-28

## 2024-05-28 ENCOUNTER — APPOINTMENT (OUTPATIENT)
Dept: ORTHOPEDIC SURGERY | Facility: CLINIC | Age: 44
End: 2024-05-28
Payer: COMMERCIAL

## 2024-05-28 PROCEDURE — 99213 OFFICE O/P EST LOW 20 MIN: CPT

## 2024-05-28 NOTE — PHYSICAL EXAM
[Right] : right knee [NL (0)] : extension 0 degrees [5___] : hamstring 5[unfilled]/5 [] : no effusion [TWNoteComboBox7] : flexion 135 degrees

## 2024-05-28 NOTE — HISTORY OF PRESENT ILLNESS
[Right Leg] : right leg [Sudden] : sudden [2] : 2 [3] : 3 [Dull/Aching] : dull/aching [Localized] : localized [Throbbing] : throbbing [Constant] : constant [Rest] : rest [Nothing helps with pain getting better] : Nothing helps with pain getting better [Standing] : standing [Walking] : walking [Stairs] : stairs [Full time] : Work status: full time [de-identified] : 9/15/23:  bilateral knee pain. on and off again. 3/22/24:   pain mostly one but got hit with stuffed animal medial knee. 5/8/24:  back to review mri. 5/28/24: would like to re review mri. [] : This patient has had an injection before: no [FreeTextEntry1] : bilateral knees.  [FreeTextEntry5] : Pt was initially impacted by a stuffed animal on thew medial side of his right knee and has been having pain and bruising for the last 2 days  [de-identified] : [facility] @ [facility]  [de-identified] : none

## 2024-05-28 NOTE — ASSESSMENT
[FreeTextEntry1] : back to review mri right knee pain again.  he got hit with relatively firm stuffed animal. mri shows mild quad strain. very mild chondral injury trochlea.  patient with health anxiety about knees.

## 2024-05-31 ENCOUNTER — APPOINTMENT (OUTPATIENT)
Dept: ORTHOPEDIC SURGERY | Facility: CLINIC | Age: 44
End: 2024-05-31
Payer: COMMERCIAL

## 2024-05-31 VITALS — WEIGHT: 160 LBS | BODY MASS INDEX: 24.25 KG/M2 | HEIGHT: 68 IN

## 2024-05-31 DIAGNOSIS — S76.211A STRAIN OF ADDUCTOR MUSCLE, FASCIA AND TENDON OF RIGHT THIGH, INITIAL ENCOUNTER: ICD-10-CM

## 2024-05-31 PROCEDURE — 99213 OFFICE O/P EST LOW 20 MIN: CPT

## 2024-05-31 PROCEDURE — 99214 OFFICE O/P EST MOD 30 MIN: CPT

## 2024-05-31 NOTE — DISCUSSION/SUMMARY
[de-identified] : 43m with right groin strain, right quad low-grade partial tear seen on MRI right knee.  1) can resume physical therapy 2) cryotherapy, rest and activity modification  3) activity as tolerated 4) questions answered, provided reassurance 5) rtc prn   Entered by Rosita Gonzalez acting as scribe. Dr. Doyle- The documentation recorded by the scribe accurately reflects the service I personally performed and the decisions made by me.

## 2024-05-31 NOTE — DATA REVIEWED
[FreeTextEntry1] : 05.03.24 MRI Right knee (standup) 1. No meniscal tear. 2. Quadriceps insertional tendinopathy and fraying with low-grade tear. 3. Joint effusion

## 2024-05-31 NOTE — HISTORY OF PRESENT ILLNESS
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 Pt declines x-ray today d/t of family hx of cancer. Patient also seeing Dr. Goodman for the right knee.

## 2024-06-14 ENCOUNTER — APPOINTMENT (OUTPATIENT)
Dept: ORTHOPEDIC SURGERY | Facility: CLINIC | Age: 44
End: 2024-06-14
Payer: COMMERCIAL

## 2024-06-14 DIAGNOSIS — S76.111D STRAIN OF RIGHT QUADRICEPS MUSCLE, FASCIA AND TENDON, SUBSEQUENT ENCOUNTER: ICD-10-CM

## 2024-06-14 DIAGNOSIS — M25.561 PAIN IN RIGHT KNEE: ICD-10-CM

## 2024-06-14 PROCEDURE — 99213 OFFICE O/P EST LOW 20 MIN: CPT

## 2024-06-14 PROCEDURE — 99214 OFFICE O/P EST MOD 30 MIN: CPT

## 2024-06-14 NOTE — ASSESSMENT
[FreeTextEntry1] : back to review mri right knee pain again.  he got hit with relatively firm stuffed animal. mri 2024 shows mild quad strain. very mild chondral injury trochlea.  repeat mri on 6/14/24 looks stable to me (no report)  patient with health anxiety about knees.

## 2024-06-14 NOTE — PHYSICAL EXAM
[Right] : right knee [5___] : hamstring 5[unfilled]/5 [NL (0)] : extension 0 degrees [] : minimal effusion [TWNoteComboBox7] : flexion 130 degrees

## 2024-06-14 NOTE — HISTORY OF PRESENT ILLNESS
[Right Leg] : right leg [Sudden] : sudden [2] : 2 [3] : 3 [Dull/Aching] : dull/aching [Localized] : localized [Throbbing] : throbbing [Constant] : constant [Rest] : rest [Nothing helps with pain getting better] : Nothing helps with pain getting better [Standing] : standing [Walking] : walking [Stairs] : stairs [Full time] : Work status: full time [de-identified] : 9/15/23:  bilateral knee pain. on and off again. 3/22/24:   pain mostly one but got hit with stuffed animal medial knee. 5/8/24:  back to review mri. 5/28/24: would like to re review mri. 6/14/24: back to review new mri he got from chiropractor. [] : This patient has had an injection before: no [FreeTextEntry1] : bilateral knees.  [FreeTextEntry5] : Pt was initially impacted by a stuffed animal on thew medial side of his right knee and has been having pain and bruising for the last 2 days  [de-identified] : [facility] @ [facility]  [de-identified] : none

## 2024-07-03 ENCOUNTER — APPOINTMENT (OUTPATIENT)
Dept: ORTHOPEDIC SURGERY | Facility: CLINIC | Age: 44
End: 2024-07-03

## 2024-07-12 ENCOUNTER — APPOINTMENT (OUTPATIENT)
Dept: ORTHOPEDIC SURGERY | Facility: CLINIC | Age: 44
End: 2024-07-12

## 2024-07-12 DIAGNOSIS — M76.50 PATELLAR TENDINITIS, UNSPECIFIED KNEE: ICD-10-CM

## 2024-07-12 DIAGNOSIS — M25.561 PAIN IN RIGHT KNEE: ICD-10-CM

## 2024-07-12 PROCEDURE — 99213 OFFICE O/P EST LOW 20 MIN: CPT

## 2024-07-12 PROCEDURE — 99214 OFFICE O/P EST MOD 30 MIN: CPT

## 2024-07-18 ENCOUNTER — APPOINTMENT (OUTPATIENT)
Dept: ORTHOPEDIC SURGERY | Facility: CLINIC | Age: 44
End: 2024-07-18

## 2024-07-18 VITALS — BODY MASS INDEX: 24.25 KG/M2 | HEIGHT: 68 IN | WEIGHT: 160 LBS

## 2024-07-18 DIAGNOSIS — S93.491D SPRAIN OF OTHER LIGAMENT OF RIGHT ANKLE, SUBSEQUENT ENCOUNTER: ICD-10-CM

## 2024-07-18 PROCEDURE — 99213 OFFICE O/P EST LOW 20 MIN: CPT

## 2024-07-24 ENCOUNTER — APPOINTMENT (OUTPATIENT)
Dept: ORTHOPEDIC SURGERY | Facility: CLINIC | Age: 44
End: 2024-07-24

## 2024-07-24 VITALS — BODY MASS INDEX: 24.25 KG/M2 | HEIGHT: 68 IN | WEIGHT: 160 LBS

## 2024-07-24 DIAGNOSIS — S46.911A STRAIN OF UNSPECIFIED MUSCLE, FASCIA AND TENDON AT SHOULDER AND UPPER ARM LEVEL, RIGHT ARM, INITIAL ENCOUNTER: ICD-10-CM

## 2024-07-24 PROCEDURE — 99214 OFFICE O/P EST MOD 30 MIN: CPT

## 2024-08-16 ENCOUNTER — APPOINTMENT (OUTPATIENT)
Dept: ORTHOPEDIC SURGERY | Facility: CLINIC | Age: 44
End: 2024-08-16
Payer: COMMERCIAL

## 2024-08-16 ENCOUNTER — APPOINTMENT (OUTPATIENT)
Dept: NEUROLOGY | Facility: CLINIC | Age: 44
End: 2024-08-16

## 2024-08-16 DIAGNOSIS — M25.561 PAIN IN RIGHT KNEE: ICD-10-CM

## 2024-08-16 DIAGNOSIS — M76.50 PATELLAR TENDINITIS, UNSPECIFIED KNEE: ICD-10-CM

## 2024-08-16 DIAGNOSIS — S83.91XD SPRAIN OF UNSPECIFIED SITE OF RIGHT KNEE, SUBSEQUENT ENCOUNTER: ICD-10-CM

## 2024-08-16 DIAGNOSIS — M22.2X1 PATELLOFEMORAL DISORDERS, RIGHT KNEE: ICD-10-CM

## 2024-08-16 DIAGNOSIS — M22.2X2 PATELLOFEMORAL DISORDERS, RIGHT KNEE: ICD-10-CM

## 2024-08-16 PROCEDURE — 99214 OFFICE O/P EST MOD 30 MIN: CPT

## 2024-08-16 NOTE — PHYSICAL EXAM
[Right] : right knee [5___] : hamstring 5[unfilled]/5 [NL (0)] : extension 0 degrees [] : no effusion [TWNoteComboBox7] : flexion 135 degrees

## 2024-08-16 NOTE — ASSESSMENT
[FreeTextEntry1] : back to review mri right knee pain again. mri 2024 shows mild quad strain. very mild chondral injury trochlea.  repeat mri on 6/14/24 looks stable to me.  repeat mri 7/24 looks stabel.  patient with health anxiety about knees.

## 2024-08-16 NOTE — HISTORY OF PRESENT ILLNESS
[Dull/Aching] : dull/aching [Sharp] : sharp [Physical therapy] : physical therapy [de-identified] : 9/15/23:  bilateral knee pain. on and off again. 3/22/24:   pain mostly one but got hit with stuffed animal medial knee. 5/8/24:  back to review mri. 5/28/24: would like to re review mri. 6/14/24: back to review new mri he got from chiropractor. 7/12/24: patient had some increased right knee with exercising. 7/24/24: back to review mri. 08/16/24 - fu for bilateral knees,  [] : no [FreeTextEntry1] : Left knee [de-identified] : movement, activity [de-identified] : MRI [facility] [de-identified] : PT 3x per week

## 2024-08-16 NOTE — DISCUSSION/SUMMARY
[de-identified] : 43yo male presents for f/u of right knee/thigh pain  1) can continue PT  2) The patient's orthopaedic condition(s) warrants consideration of consistent or intermittent use of a prescription strength non-steroidal anti-inflammatory medication.  These medications are associated with risks including but not limited to gastrointestinal irritation, kidney damage, hypertension, and bleeding.    Although clinically indicated, the patient defers taking such medications at this time. 3) use of cryotherapy discussed 4) if symptoms persist in the future, will consider EMG 4) RTC in 6 weeks   Progress Note completed by Halle Vernon PA-C * Dr. Goodman -- The documentation recorded in this note accurately reflects the decisions made by me during this visit.  I interviewed and examined the patient personally and oversaw all medical decisions.

## 2024-09-27 ENCOUNTER — APPOINTMENT (OUTPATIENT)
Dept: ORTHOPEDIC SURGERY | Facility: CLINIC | Age: 44
End: 2024-09-27
Payer: COMMERCIAL

## 2024-09-27 DIAGNOSIS — M51.36 OTHER INTERVERTEBRAL DISC DEGENERATION, LUMBAR REGION: ICD-10-CM

## 2024-09-27 PROCEDURE — 99213 OFFICE O/P EST LOW 20 MIN: CPT

## 2024-09-27 PROCEDURE — 99214 OFFICE O/P EST MOD 30 MIN: CPT

## 2024-09-27 NOTE — PHYSICAL EXAM
[Normal] : Gait: normal [Pronator Drift] : negative pronator drift [SLR] : negative straight leg raise [Trendelenburg's Test] : negative Trendelenburg's test [de-identified] : Adequate motor strength, sensation is intact and symmetrical full range of motion flexion extension and rotation, full range of motion of hips knees shoulders and elbows (all four extremities), no atrophy, negative straight leg raise, no swelling, normal ambulation, no apparent distress skin is intact, no upper or lower extremity instability, alert and oriented x3 and normal mood. Normal finger-to nose test. Adequate heal walk and toe walk.  [de-identified] : MAGNETIC RESONANCE IMAGING OF THE LUMBAR SPINE  9/4/24  ([facility])  TECHNIQUE: Multiplanar, multisequential MRI was performed in the neutral sitting position. HISTORY: The patient complains of low back pain with weakness and difficulty walking. COMPARISON: Examination is compared to previous outside MRI study of the lumbar spine  dated 04/14/2023. INTERPRETATION: At the L5-S1 disc space level, disc bulge is noted deforming the thecal sac abutting the proximal  S1 nerve roots bilaterally with bilateral inferior neural foraminal extension. Preservation of disc  space height and signal is noted. Bilateral facet and ligamentous hypertrophy is noted. At L4-5, disc herniation is noted deforming the thecal sac abutting the proximal L5 nerve roots  bilaterally with bilateral inferior neural foraminal extension. Loss of disc signal is noted with  preservation of disc space height. Bilateral facet and ligamentous hypertrophy is noted. At L3-4, there is no evidence of herniated disc, spinal canal compromise, neural foraminal  stenosis, lateral recess encroachment or loss of disc space height or signal.  At L2-3, there is no evidence of herniated disc, spinal canal compromise, neural foraminal  stenosis, lateral recess encroachment or loss of disc space height or signal.  At L1-2, there is no evidence of herniated disc, spinal canal compromise, neural foraminal  stenosis, lateral recess encroachment or loss of disc space height or signal.  At T12-L1, there is no evidence of herniated disc, spinal canal compromise, neural foraminal stenosis, lateral recess encroachment or loss of disc space height or signal.  There is only limited assessment provided of the T11-12 disc space at the peripheral margin of  the included field of view. Approximately 1.2-cm of fluid signal intensity focus is noted within the posterior skin and  subcutaneous tissues at the approximate T11 vertebral body level at the peripheral margin of the  included field of view. Etiology secondary to cyst would be considered. There is no evidence of lumbar vertebral body compression fracture. There is no evidence of  bone marrow infiltrative disorder. There is no evidence of spondylolisthesis. There is no  evidence of signal elevation within the conus medullaris which is located at the approximate L1- 2 disc space level. Examination is compared to previous outside MRI study of the lumbar spine dated 04/14/2023.  Suggested cyst within the skin and posterior subcutaneous tissues at the T11 vertebral body level  represents interval development compared to prior examination. IMPRESSION: - L4-5 disc herniation deforming the thecal sac abutting the proximal L5 nerve roots  bilaterally with bilateral inferior neural foraminal extension. - L5-S1 disc bulge deforming the thecal sac abutting the proximal S1 nerve roots  bilaterally with bilateral inferior neural foraminal extension. - 1.2 cm cyst suggested within the skin and posterior subcutaneous tissues partially  visualized at the peripheral margin of the included field of view at the approximate T11  vertebral body level. Correlation with targeted sonography is recommended for additional  characterization.

## 2024-09-27 NOTE — HISTORY OF PRESENT ILLNESS
[Improving] : improving [de-identified] : 44 year old male presents for evaluation of low back pain for 1 month. Underwent MRI lumbar. Right 10 % quad tendon tear-PT. No pain-feels great. Ht-5' 8" 173 lbs. No allergies. -ETOH and tobacco. No fever chills sweats nausea vomiting no bowel or bladder dysfunction, no recent weight loss or gain no night pain. This history is in addition to the intake form that I personally reviewed.

## 2024-09-27 NOTE — PHYSICAL EXAM
[Normal] : Gait: normal [Pronator Drift] : negative pronator drift [SLR] : negative straight leg raise [Trendelenburg's Test] : negative Trendelenburg's test [de-identified] : Adequate motor strength, sensation is intact and symmetrical full range of motion flexion extension and rotation, full range of motion of hips knees shoulders and elbows (all four extremities), no atrophy, negative straight leg raise, no swelling, normal ambulation, no apparent distress skin is intact, no upper or lower extremity instability, alert and oriented x3 and normal mood. Normal finger-to nose test. Adequate heal walk and toe walk.  [de-identified] : MAGNETIC RESONANCE IMAGING OF THE LUMBAR SPINE  9/4/24  ([facility])  TECHNIQUE: Multiplanar, multisequential MRI was performed in the neutral sitting position. HISTORY: The patient complains of low back pain with weakness and difficulty walking. COMPARISON: Examination is compared to previous outside MRI study of the lumbar spine  dated 04/14/2023. INTERPRETATION: At the L5-S1 disc space level, disc bulge is noted deforming the thecal sac abutting the proximal  S1 nerve roots bilaterally with bilateral inferior neural foraminal extension. Preservation of disc  space height and signal is noted. Bilateral facet and ligamentous hypertrophy is noted. At L4-5, disc herniation is noted deforming the thecal sac abutting the proximal L5 nerve roots  bilaterally with bilateral inferior neural foraminal extension. Loss of disc signal is noted with  preservation of disc space height. Bilateral facet and ligamentous hypertrophy is noted. At L3-4, there is no evidence of herniated disc, spinal canal compromise, neural foraminal  stenosis, lateral recess encroachment or loss of disc space height or signal.  At L2-3, there is no evidence of herniated disc, spinal canal compromise, neural foraminal  stenosis, lateral recess encroachment or loss of disc space height or signal.  At L1-2, there is no evidence of herniated disc, spinal canal compromise, neural foraminal  stenosis, lateral recess encroachment or loss of disc space height or signal.  At T12-L1, there is no evidence of herniated disc, spinal canal compromise, neural foraminal stenosis, lateral recess encroachment or loss of disc space height or signal.  There is only limited assessment provided of the T11-12 disc space at the peripheral margin of  the included field of view. Approximately 1.2-cm of fluid signal intensity focus is noted within the posterior skin and  subcutaneous tissues at the approximate T11 vertebral body level at the peripheral margin of the  included field of view. Etiology secondary to cyst would be considered. There is no evidence of lumbar vertebral body compression fracture. There is no evidence of  bone marrow infiltrative disorder. There is no evidence of spondylolisthesis. There is no  evidence of signal elevation within the conus medullaris which is located at the approximate L1- 2 disc space level. Examination is compared to previous outside MRI study of the lumbar spine dated 04/14/2023.  Suggested cyst within the skin and posterior subcutaneous tissues at the T11 vertebral body level  represents interval development compared to prior examination. IMPRESSION: - L4-5 disc herniation deforming the thecal sac abutting the proximal L5 nerve roots  bilaterally with bilateral inferior neural foraminal extension. - L5-S1 disc bulge deforming the thecal sac abutting the proximal S1 nerve roots  bilaterally with bilateral inferior neural foraminal extension. - 1.2 cm cyst suggested within the skin and posterior subcutaneous tissues partially  visualized at the peripheral margin of the included field of view at the approximate T11  vertebral body level. Correlation with targeted sonography is recommended for additional  characterization.

## 2024-09-27 NOTE — DISCUSSION/SUMMARY
[de-identified] : Lumbar degenerative disc disease. Feels great. PT Going to the gym. F/U PRN. Saw Dr. Ramirez-physiatry. All options discussed including rest, medicine, home exercise, acupuncture, Chiropractic care, Physical Therapy, Pain management, and last resort surgery. All questions were answered, all alternatives discussed and the patient is in complete agreement with the treatment plan which the patient contributed to and discussed with me through the shared decision making process. Follow-up appointment as instructed. Any issues and the patient will call or come in sooner.

## 2024-09-27 NOTE — HISTORY OF PRESENT ILLNESS
[Improving] : improving [de-identified] : 44 year old male presents for evaluation of low back pain for 1 month. Underwent MRI lumbar. Right 10 % quad tendon tear-PT. No pain-feels great. Ht-5' 8" 173 lbs. No allergies. -ETOH and tobacco. No fever chills sweats nausea vomiting no bowel or bladder dysfunction, no recent weight loss or gain no night pain. This history is in addition to the intake form that I personally reviewed.

## 2024-09-27 NOTE — PHYSICAL EXAM
[Normal] : Gait: normal [Pronator Drift] : negative pronator drift [SLR] : negative straight leg raise [Trendelenburg's Test] : negative Trendelenburg's test [de-identified] : Adequate motor strength, sensation is intact and symmetrical full range of motion flexion extension and rotation, full range of motion of hips knees shoulders and elbows (all four extremities), no atrophy, negative straight leg raise, no swelling, normal ambulation, no apparent distress skin is intact, no upper or lower extremity instability, alert and oriented x3 and normal mood. Normal finger-to nose test. Adequate heal walk and toe walk.  [de-identified] : MAGNETIC RESONANCE IMAGING OF THE LUMBAR SPINE  9/4/24  ([facility])  TECHNIQUE: Multiplanar, multisequential MRI was performed in the neutral sitting position. HISTORY: The patient complains of low back pain with weakness and difficulty walking. COMPARISON: Examination is compared to previous outside MRI study of the lumbar spine  dated 04/14/2023. INTERPRETATION: At the L5-S1 disc space level, disc bulge is noted deforming the thecal sac abutting the proximal  S1 nerve roots bilaterally with bilateral inferior neural foraminal extension. Preservation of disc  space height and signal is noted. Bilateral facet and ligamentous hypertrophy is noted. At L4-5, disc herniation is noted deforming the thecal sac abutting the proximal L5 nerve roots  bilaterally with bilateral inferior neural foraminal extension. Loss of disc signal is noted with  preservation of disc space height. Bilateral facet and ligamentous hypertrophy is noted. At L3-4, there is no evidence of herniated disc, spinal canal compromise, neural foraminal  stenosis, lateral recess encroachment or loss of disc space height or signal.  At L2-3, there is no evidence of herniated disc, spinal canal compromise, neural foraminal  stenosis, lateral recess encroachment or loss of disc space height or signal.  At L1-2, there is no evidence of herniated disc, spinal canal compromise, neural foraminal  stenosis, lateral recess encroachment or loss of disc space height or signal.  At T12-L1, there is no evidence of herniated disc, spinal canal compromise, neural foraminal stenosis, lateral recess encroachment or loss of disc space height or signal.  There is only limited assessment provided of the T11-12 disc space at the peripheral margin of  the included field of view. Approximately 1.2-cm of fluid signal intensity focus is noted within the posterior skin and  subcutaneous tissues at the approximate T11 vertebral body level at the peripheral margin of the  included field of view. Etiology secondary to cyst would be considered. There is no evidence of lumbar vertebral body compression fracture. There is no evidence of  bone marrow infiltrative disorder. There is no evidence of spondylolisthesis. There is no  evidence of signal elevation within the conus medullaris which is located at the approximate L1- 2 disc space level. Examination is compared to previous outside MRI study of the lumbar spine dated 04/14/2023.  Suggested cyst within the skin and posterior subcutaneous tissues at the T11 vertebral body level  represents interval development compared to prior examination. IMPRESSION: - L4-5 disc herniation deforming the thecal sac abutting the proximal L5 nerve roots  bilaterally with bilateral inferior neural foraminal extension. - L5-S1 disc bulge deforming the thecal sac abutting the proximal S1 nerve roots  bilaterally with bilateral inferior neural foraminal extension. - 1.2 cm cyst suggested within the skin and posterior subcutaneous tissues partially  visualized at the peripheral margin of the included field of view at the approximate T11  vertebral body level. Correlation with targeted sonography is recommended for additional  characterization.

## 2024-09-27 NOTE — DISCUSSION/SUMMARY
[de-identified] : Lumbar degenerative disc disease. Feels great. PT Going to the gym. F/U PRN. Saw Dr. Ramirez-physiatry. All options discussed including rest, medicine, home exercise, acupuncture, Chiropractic care, Physical Therapy, Pain management, and last resort surgery. All questions were answered, all alternatives discussed and the patient is in complete agreement with the treatment plan which the patient contributed to and discussed with me through the shared decision making process. Follow-up appointment as instructed. Any issues and the patient will call or come in sooner.

## 2024-09-27 NOTE — HISTORY OF PRESENT ILLNESS
[Improving] : improving [de-identified] : 44 year old male presents for evaluation of low back pain for 1 month. Underwent MRI lumbar. Right 10 % quad tendon tear-PT. No pain-feels great. Ht-5' 8" 173 lbs. No allergies. -ETOH and tobacco. No fever chills sweats nausea vomiting no bowel or bladder dysfunction, no recent weight loss or gain no night pain. This history is in addition to the intake form that I personally reviewed.

## 2024-09-27 NOTE — DISCUSSION/SUMMARY
[de-identified] : Lumbar degenerative disc disease. Feels great. PT Going to the gym. F/U PRN. Saw Dr. Ramirez-physiatry. All options discussed including rest, medicine, home exercise, acupuncture, Chiropractic care, Physical Therapy, Pain management, and last resort surgery. All questions were answered, all alternatives discussed and the patient is in complete agreement with the treatment plan which the patient contributed to and discussed with me through the shared decision making process. Follow-up appointment as instructed. Any issues and the patient will call or come in sooner.

## 2024-10-04 ENCOUNTER — APPOINTMENT (OUTPATIENT)
Dept: ORTHOPEDIC SURGERY | Facility: CLINIC | Age: 44
End: 2024-10-04
Payer: COMMERCIAL

## 2024-10-04 DIAGNOSIS — M76.891 OTHER SPECIFIED ENTHESOPATHIES OF RIGHT LOWER LIMB, EXCLUDING FOOT: ICD-10-CM

## 2024-10-04 DIAGNOSIS — S76.311A STRAIN OF MUSCLE, FASCIA AND TENDON OF THE POSTERIOR MUSCLE GROUP AT THIGH LEVEL, RIGHT THIGH, INITIAL ENCOUNTER: ICD-10-CM

## 2024-10-04 DIAGNOSIS — M22.2X9 PATELLOFEMORAL DISORDERS, UNSPECIFIED KNEE: ICD-10-CM

## 2024-10-04 PROCEDURE — 99213 OFFICE O/P EST LOW 20 MIN: CPT

## 2024-10-04 NOTE — HISTORY OF PRESENT ILLNESS
[Dull/Aching] : dull/aching [Sharp] : sharp [Physical therapy] : physical therapy [de-identified] : 9/15/23:  bilateral knee pain. on and off again. 3/22/24:   pain mostly one but got hit with stuffed animal medial knee. 5/8/24:  back to review mri. 5/28/24: would like to re review mri. 6/14/24: back to review new mri he got from chiropractor. 7/12/24: patient had some increased right knee with exercising. 7/24/24: back to review mri. 08/16/24 - fu for bilateral knees,   10/04/24 - pt is here for fu on bilateral knees, doing okay. continues pt / slow progress. states some discomforting going up stairs, not sure if reinjury left knee.   [] : no [FreeTextEntry1] : Left knee [de-identified] : movement, activity [de-identified] : MRI [facility] [de-identified] : PT 3x per week

## 2024-10-04 NOTE — PHYSICAL EXAM
[Right] : right knee [NL (0)] : extension 0 degrees [4___] : quadriceps 4[unfilled]/5 [5___] : hamstring 5[unfilled]/5 [] : no effusion [TWNoteComboBox7] : flexion 125 degrees

## 2024-10-04 NOTE — DISCUSSION/SUMMARY
[de-identified] : HEP and PT.  The patient's orthopaedic condition(s) warrants consideration of consistent or intermittent use of a prescription strength non-steroidal anti-inflammatory medication.  These medications are associated with risks including but not limited to gastrointestinal irritation, kidney damage, hypertension, and bleeding.    Although clinically indicated, the patient defers taking such medications at this time.  MRI left knee to assess: Rule out LMT versus PF pain.  Follow up MRI left knee.

## 2024-10-04 NOTE — ASSESSMENT
[FreeTextEntry1] : back to review mri right knee pain again. mri 2024 shows mild quad strain. very mild chondral injury trochlea.  repeat mri on 6/14/24 looks stable to me.  repeat mri 7/24 looks stable. Some improvement with PT  Now left knee pain with twisting. concern for lmt versus pf syndrome.  Patient defers xrays today and says he would rather have MRI. Explained to patient xrays would be part of evaluation but he defers.  patient with health anxiety about knees.

## 2024-10-08 ENCOUNTER — APPOINTMENT (OUTPATIENT)
Dept: INTERNAL MEDICINE | Facility: CLINIC | Age: 44
End: 2024-10-08

## 2024-10-09 ENCOUNTER — NON-APPOINTMENT (OUTPATIENT)
Age: 44
End: 2024-10-09

## 2024-10-09 ENCOUNTER — APPOINTMENT (OUTPATIENT)
Dept: ORTHOPEDIC SURGERY | Facility: CLINIC | Age: 44
End: 2024-10-09

## 2024-10-11 ENCOUNTER — APPOINTMENT (OUTPATIENT)
Dept: ORTHOPEDIC SURGERY | Facility: CLINIC | Age: 44
End: 2024-10-11
Payer: COMMERCIAL

## 2024-10-11 DIAGNOSIS — M22.2X2 PATELLOFEMORAL DISORDERS, RIGHT KNEE: ICD-10-CM

## 2024-10-11 DIAGNOSIS — M76.50 PATELLAR TENDINITIS, UNSPECIFIED KNEE: ICD-10-CM

## 2024-10-11 DIAGNOSIS — M22.2X1 PATELLOFEMORAL DISORDERS, RIGHT KNEE: ICD-10-CM

## 2024-10-11 DIAGNOSIS — M25.562 PAIN IN LEFT KNEE: ICD-10-CM

## 2024-10-11 DIAGNOSIS — M76.899 OTHER SPECIFIED ENTHESOPATHIES OF UNSPECIFIED LOWER LIMB, EXCLUDING FOOT: ICD-10-CM

## 2024-10-11 DIAGNOSIS — M25.561 PAIN IN RIGHT KNEE: ICD-10-CM

## 2024-10-11 DIAGNOSIS — M76.892 OTHER SPECIFIED ENTHESOPATHIES OF LEFT LOWER LIMB, EXCLUDING FOOT: ICD-10-CM

## 2024-10-11 PROCEDURE — 99214 OFFICE O/P EST MOD 30 MIN: CPT

## 2024-10-22 ENCOUNTER — APPOINTMENT (OUTPATIENT)
Dept: ORTHOPEDIC SURGERY | Facility: CLINIC | Age: 44
End: 2024-10-22
Payer: COMMERCIAL

## 2024-10-22 VITALS — HEIGHT: 68 IN | WEIGHT: 170 LBS | BODY MASS INDEX: 25.76 KG/M2

## 2024-10-22 DIAGNOSIS — S76.119D STRAIN OF UNSPECIFIED QUADRICEPS MUSCLE, FASCIA AND TENDON, SUBSEQUENT ENCOUNTER: ICD-10-CM

## 2024-10-22 DIAGNOSIS — S76.119A STRAIN OF UNSPECIFIED QUADRICEPS MUSCLE, FASCIA AND TENDON, INITIAL ENCOUNTER: ICD-10-CM

## 2024-10-22 DIAGNOSIS — S76.111A STRAIN OF RIGHT QUADRICEPS MUSCLE, FASCIA AND TENDON, INITIAL ENCOUNTER: ICD-10-CM

## 2024-10-22 PROCEDURE — 99213 OFFICE O/P EST LOW 20 MIN: CPT

## 2024-10-22 PROCEDURE — 99203 OFFICE O/P NEW LOW 30 MIN: CPT

## 2024-10-25 ENCOUNTER — APPOINTMENT (OUTPATIENT)
Dept: ORTHOPEDIC SURGERY | Facility: CLINIC | Age: 44
End: 2024-10-25
Payer: COMMERCIAL

## 2024-10-25 ENCOUNTER — APPOINTMENT (OUTPATIENT)
Dept: ORTHOPEDIC SURGERY | Facility: CLINIC | Age: 44
End: 2024-10-25

## 2024-10-25 VITALS — HEIGHT: 68 IN | WEIGHT: 170 LBS | BODY MASS INDEX: 25.76 KG/M2

## 2024-10-25 DIAGNOSIS — S76.112A STRAIN OF LEFT QUADRICEPS MUSCLE, FASCIA AND TENDON, INITIAL ENCOUNTER: ICD-10-CM

## 2024-10-25 DIAGNOSIS — M25.562 PAIN IN LEFT KNEE: ICD-10-CM

## 2024-10-25 DIAGNOSIS — M76.899 OTHER SPECIFIED ENTHESOPATHIES OF UNSPECIFIED LOWER LIMB, EXCLUDING FOOT: ICD-10-CM

## 2024-10-25 PROCEDURE — 99214 OFFICE O/P EST MOD 30 MIN: CPT

## 2024-10-26 ENCOUNTER — APPOINTMENT (OUTPATIENT)
Dept: ORTHOPEDIC SURGERY | Facility: CLINIC | Age: 44
End: 2024-10-26
Payer: COMMERCIAL

## 2024-10-26 DIAGNOSIS — M25.561 PAIN IN RIGHT KNEE: ICD-10-CM

## 2024-10-26 DIAGNOSIS — G89.29 PAIN IN RIGHT KNEE: ICD-10-CM

## 2024-10-26 PROCEDURE — 99213 OFFICE O/P EST LOW 20 MIN: CPT

## 2024-10-27 PROBLEM — S83.241A TEAR OF MEDIAL MENISCUS OF RIGHT KNEE, CURRENT, UNSPECIFIED TEAR TYPE, INITIAL ENCOUNTER: Status: ACTIVE | Noted: 2024-10-27

## 2024-10-28 ENCOUNTER — RESULT REVIEW (OUTPATIENT)
Age: 44
End: 2024-10-28

## 2024-11-08 ENCOUNTER — APPOINTMENT (OUTPATIENT)
Dept: ORTHOPEDIC SURGERY | Facility: CLINIC | Age: 44
End: 2024-11-08
Payer: COMMERCIAL

## 2024-11-08 PROBLEM — M22.41 CHONDROMALACIA OF RIGHT PATELLA: Status: ACTIVE | Noted: 2024-11-08

## 2024-11-08 PROCEDURE — 73562 X-RAY EXAM OF KNEE 3: CPT | Mod: RT

## 2024-11-08 PROCEDURE — 99213 OFFICE O/P EST LOW 20 MIN: CPT | Mod: 25

## 2024-11-08 PROCEDURE — 99214 OFFICE O/P EST MOD 30 MIN: CPT | Mod: 25

## 2024-11-15 ENCOUNTER — APPOINTMENT (OUTPATIENT)
Dept: ORTHOPEDIC SURGERY | Facility: CLINIC | Age: 44
End: 2024-11-15
Payer: COMMERCIAL

## 2024-11-15 DIAGNOSIS — M17.11 UNILATERAL PRIMARY OSTEOARTHRITIS, RIGHT KNEE: ICD-10-CM

## 2024-11-15 DIAGNOSIS — M22.41 CHONDROMALACIA PATELLAE, RIGHT KNEE: ICD-10-CM

## 2024-11-15 DIAGNOSIS — M22.2X2 PATELLOFEMORAL DISORDERS, RIGHT KNEE: ICD-10-CM

## 2024-11-15 DIAGNOSIS — M22.2X1 PATELLOFEMORAL DISORDERS, RIGHT KNEE: ICD-10-CM

## 2024-11-15 PROCEDURE — 99214 OFFICE O/P EST MOD 30 MIN: CPT

## 2024-12-06 ENCOUNTER — APPOINTMENT (OUTPATIENT)
Dept: INTERNAL MEDICINE | Facility: CLINIC | Age: 44
End: 2024-12-06

## 2024-12-27 ENCOUNTER — APPOINTMENT (OUTPATIENT)
Dept: INTERNAL MEDICINE | Facility: CLINIC | Age: 44
End: 2024-12-27
Payer: COMMERCIAL

## 2024-12-27 VITALS
DIASTOLIC BLOOD PRESSURE: 84 MMHG | SYSTOLIC BLOOD PRESSURE: 140 MMHG | RESPIRATION RATE: 12 BRPM | HEART RATE: 74 BPM | OXYGEN SATURATION: 97 %

## 2024-12-27 DIAGNOSIS — M25.561 PAIN IN RIGHT KNEE: ICD-10-CM

## 2024-12-27 DIAGNOSIS — R03.0 ELEVATED BLOOD-PRESSURE READING, W/OUT DIAGNOSIS OF HYPERTENSION: ICD-10-CM

## 2024-12-27 PROCEDURE — 99213 OFFICE O/P EST LOW 20 MIN: CPT

## 2024-12-27 PROCEDURE — G2211 COMPLEX E/M VISIT ADD ON: CPT | Mod: NC

## 2025-01-03 ENCOUNTER — APPOINTMENT (OUTPATIENT)
Dept: ORTHOPEDIC SURGERY | Facility: CLINIC | Age: 45
End: 2025-01-03
Payer: COMMERCIAL

## 2025-01-03 DIAGNOSIS — R29.898 OTHER SYMPTOMS AND SIGNS INVOLVING THE MUSCULOSKELETAL SYSTEM: ICD-10-CM

## 2025-01-03 DIAGNOSIS — M25.561 PAIN IN RIGHT KNEE: ICD-10-CM

## 2025-01-03 DIAGNOSIS — M22.41 CHONDROMALACIA PATELLAE, RIGHT KNEE: ICD-10-CM

## 2025-01-03 DIAGNOSIS — G89.29 PAIN IN RIGHT KNEE: ICD-10-CM

## 2025-01-03 PROCEDURE — 99213 OFFICE O/P EST LOW 20 MIN: CPT

## 2025-01-13 ENCOUNTER — TRANSCRIPTION ENCOUNTER (OUTPATIENT)
Age: 45
End: 2025-01-13

## 2025-01-16 ENCOUNTER — APPOINTMENT (OUTPATIENT)
Dept: NEUROLOGY | Facility: CLINIC | Age: 45
End: 2025-01-16
Payer: COMMERCIAL

## 2025-01-16 PROCEDURE — 95910 NRV CNDJ TEST 7-8 STUDIES: CPT

## 2025-01-16 PROCEDURE — 95886 MUSC TEST DONE W/N TEST COMP: CPT

## 2025-01-17 DIAGNOSIS — S83.206A UNSPECIFIED TEAR OF UNSPECIFIED MENISCUS, CURRENT INJURY, RIGHT KNEE, INITIAL ENCOUNTER: ICD-10-CM

## 2025-01-24 ENCOUNTER — APPOINTMENT (OUTPATIENT)
Dept: ORTHOPEDIC SURGERY | Facility: CLINIC | Age: 45
End: 2025-01-24
Payer: COMMERCIAL

## 2025-01-24 DIAGNOSIS — M17.11 UNILATERAL PRIMARY OSTEOARTHRITIS, RIGHT KNEE: ICD-10-CM

## 2025-01-24 DIAGNOSIS — M22.2X2 PATELLOFEMORAL DISORDERS, RIGHT KNEE: ICD-10-CM

## 2025-01-24 DIAGNOSIS — M76.50 PATELLAR TENDINITIS, UNSPECIFIED KNEE: ICD-10-CM

## 2025-01-24 DIAGNOSIS — S83.241A OTHER TEAR OF MEDIAL MENISCUS, CURRENT INJURY, RIGHT KNEE, INITIAL ENCOUNTER: ICD-10-CM

## 2025-01-24 DIAGNOSIS — M22.2X1 PATELLOFEMORAL DISORDERS, RIGHT KNEE: ICD-10-CM

## 2025-01-24 PROCEDURE — 99213 OFFICE O/P EST LOW 20 MIN: CPT

## 2025-02-07 ENCOUNTER — NON-APPOINTMENT (OUTPATIENT)
Age: 45
End: 2025-02-07

## 2025-02-07 ENCOUNTER — APPOINTMENT (OUTPATIENT)
Dept: INTERNAL MEDICINE | Facility: CLINIC | Age: 45
End: 2025-02-07
Payer: COMMERCIAL

## 2025-02-07 VITALS — BODY MASS INDEX: 26.07 KG/M2 | HEIGHT: 68 IN | WEIGHT: 172 LBS

## 2025-02-07 VITALS
SYSTOLIC BLOOD PRESSURE: 138 MMHG | RESPIRATION RATE: 12 BRPM | OXYGEN SATURATION: 98 % | HEART RATE: 80 BPM | DIASTOLIC BLOOD PRESSURE: 82 MMHG

## 2025-02-07 DIAGNOSIS — Z12.11 ENCOUNTER FOR SCREENING FOR MALIGNANT NEOPLASM OF COLON: ICD-10-CM

## 2025-02-07 DIAGNOSIS — M62.831 MUSCLE SPASM OF CALF: ICD-10-CM

## 2025-02-07 DIAGNOSIS — Z00.00 ENCOUNTER FOR GENERAL ADULT MEDICAL EXAMINATION W/OUT ABNORMAL FINDINGS: ICD-10-CM

## 2025-02-07 DIAGNOSIS — R03.0 ELEVATED BLOOD-PRESSURE READING, W/OUT DIAGNOSIS OF HYPERTENSION: ICD-10-CM

## 2025-02-07 PROCEDURE — 99212 OFFICE O/P EST SF 10 MIN: CPT | Mod: 25

## 2025-02-07 PROCEDURE — 93000 ELECTROCARDIOGRAM COMPLETE: CPT

## 2025-02-07 PROCEDURE — 99396 PREV VISIT EST AGE 40-64: CPT

## 2025-02-21 ENCOUNTER — APPOINTMENT (OUTPATIENT)
Dept: ORTHOPEDIC SURGERY | Facility: CLINIC | Age: 45
End: 2025-02-21
Payer: COMMERCIAL

## 2025-02-21 ENCOUNTER — APPOINTMENT (OUTPATIENT)
Dept: ORTHOPEDIC SURGERY | Facility: CLINIC | Age: 45
End: 2025-02-21

## 2025-02-21 VITALS — WEIGHT: 172 LBS | BODY MASS INDEX: 26.07 KG/M2 | HEIGHT: 68 IN

## 2025-02-21 DIAGNOSIS — S90.31XA CONTUSION OF RIGHT FOOT, INITIAL ENCOUNTER: ICD-10-CM

## 2025-02-21 PROCEDURE — 73630 X-RAY EXAM OF FOOT: CPT | Mod: RT

## 2025-02-21 PROCEDURE — 99212 OFFICE O/P EST SF 10 MIN: CPT | Mod: 25

## 2025-03-09 NOTE — ED PROVIDER NOTE - PATIENT PORTAL LINK FT
Pt laying in bed resting with eyed open. Family at bedside. PT reports 10/10 bilateral flank and leg pain. Pain medications administers. No other needs at this time. Bed locked and in the lowest position, side rails up x1 (on sitting on the side of the bed where the rails is down), call bell in reach.    You can access the FollowMyHealth Patient Portal offered by [facility] by registering at the following website: [email or web address]. By joining [employer]’s FollowMyHealth portal, you will also be able to view your health information using other applications (apps) compatible with our system.

## 2025-03-10 ENCOUNTER — APPOINTMENT (OUTPATIENT)
Dept: ORTHOPEDIC SURGERY | Facility: CLINIC | Age: 45
End: 2025-03-10

## 2025-03-19 ENCOUNTER — APPOINTMENT (OUTPATIENT)
Dept: ORTHOPEDIC SURGERY | Facility: CLINIC | Age: 45
End: 2025-03-19

## 2025-04-14 ENCOUNTER — APPOINTMENT (OUTPATIENT)
Dept: NEUROLOGY | Facility: CLINIC | Age: 45
End: 2025-04-14
Payer: COMMERCIAL

## 2025-04-14 VITALS
BODY MASS INDEX: 25.76 KG/M2 | DIASTOLIC BLOOD PRESSURE: 80 MMHG | WEIGHT: 170 LBS | HEART RATE: 89 BPM | SYSTOLIC BLOOD PRESSURE: 120 MMHG | HEIGHT: 68 IN

## 2025-04-14 DIAGNOSIS — M62.81 MUSCLE WEAKNESS (GENERALIZED): ICD-10-CM

## 2025-04-14 PROCEDURE — 99213 OFFICE O/P EST LOW 20 MIN: CPT

## 2025-04-15 ENCOUNTER — APPOINTMENT (OUTPATIENT)
Dept: NEUROLOGY | Facility: CLINIC | Age: 45
End: 2025-04-15

## 2025-04-22 ENCOUNTER — NON-APPOINTMENT (OUTPATIENT)
Age: 45
End: 2025-04-22

## 2025-04-28 ENCOUNTER — APPOINTMENT (OUTPATIENT)
Dept: ORTHOPEDIC SURGERY | Facility: CLINIC | Age: 45
End: 2025-04-28
Payer: COMMERCIAL

## 2025-04-28 VITALS — BODY MASS INDEX: 25.76 KG/M2 | HEIGHT: 68 IN | WEIGHT: 170 LBS

## 2025-04-28 DIAGNOSIS — M25.562 PAIN IN LEFT KNEE: ICD-10-CM

## 2025-04-28 PROCEDURE — 99212 OFFICE O/P EST SF 10 MIN: CPT

## 2025-04-30 ENCOUNTER — APPOINTMENT (OUTPATIENT)
Dept: ORTHOPEDIC SURGERY | Facility: CLINIC | Age: 45
End: 2025-04-30

## 2025-05-02 ENCOUNTER — APPOINTMENT (OUTPATIENT)
Dept: ORTHOPEDIC SURGERY | Facility: CLINIC | Age: 45
End: 2025-05-02
Payer: COMMERCIAL

## 2025-05-02 DIAGNOSIS — M25.561 PAIN IN RIGHT KNEE: ICD-10-CM

## 2025-05-02 DIAGNOSIS — S76.119D STRAIN OF UNSPECIFIED QUADRICEPS MUSCLE, FASCIA AND TENDON, SUBSEQUENT ENCOUNTER: ICD-10-CM

## 2025-05-02 DIAGNOSIS — M22.41 CHONDROMALACIA PATELLAE, RIGHT KNEE: ICD-10-CM

## 2025-05-02 PROCEDURE — 99214 OFFICE O/P EST MOD 30 MIN: CPT

## 2025-05-19 ENCOUNTER — NON-APPOINTMENT (OUTPATIENT)
Age: 45
End: 2025-05-19

## 2025-05-21 ENCOUNTER — OFFICE (OUTPATIENT)
Dept: URBAN - METROPOLITAN AREA CLINIC 27 | Facility: CLINIC | Age: 45
Setting detail: OPHTHALMOLOGY
End: 2025-05-21
Payer: COMMERCIAL

## 2025-05-21 DIAGNOSIS — H00.14: ICD-10-CM

## 2025-05-21 DIAGNOSIS — H01.005: ICD-10-CM

## 2025-05-21 DIAGNOSIS — H01.001: ICD-10-CM

## 2025-05-21 DIAGNOSIS — H01.002: ICD-10-CM

## 2025-05-21 DIAGNOSIS — H01.004: ICD-10-CM

## 2025-05-21 PROCEDURE — 92002 INTRM OPH EXAM NEW PATIENT: CPT | Performed by: OPHTHALMOLOGY

## 2025-05-21 ASSESSMENT — REFRACTION_AUTOREFRACTION
OD_SPHERE: -3.50
OS_AXIS: 1
OD_AXIS: 160
OS_SPHERE: -1.25
OS_CYLINDER: +1.00
OD_CYLINDER: +2.75

## 2025-05-21 ASSESSMENT — KERATOMETRY
OS_AXISANGLE_DEGREES: 3
METHOD_AUTO_MANUAL: AUTO
OD_K1POWER_DIOPTERS: 41.75
OS_K2POWER_DIOPTERS: 43.50
OD_K2POWER_DIOPTERS: 44.50
OD_AXISANGLE_DEGREES: 163
OS_K1POWER_DIOPTERS: 42.25

## 2025-05-21 ASSESSMENT — LID EXAM ASSESSMENTS
OD_BLEPHARITIS: RLL RUL 2+
OS_BLEPHARITIS: LLL LUL 2+

## 2025-05-21 ASSESSMENT — VISUAL ACUITY
OD_BCVA: 20/25
OS_BCVA: 20/40

## 2025-06-27 ENCOUNTER — APPOINTMENT (OUTPATIENT)
Dept: ORTHOPEDIC SURGERY | Facility: CLINIC | Age: 45
End: 2025-06-27

## 2025-06-30 ENCOUNTER — NON-APPOINTMENT (OUTPATIENT)
Age: 45
End: 2025-06-30

## 2025-07-05 ENCOUNTER — APPOINTMENT (OUTPATIENT)
Dept: ORTHOPEDIC SURGERY | Facility: CLINIC | Age: 45
End: 2025-07-05

## 2025-07-05 PROBLEM — M62.830 LUMBAR PARASPINAL MUSCLE SPASM: Status: ACTIVE | Noted: 2025-07-05

## 2025-07-05 PROBLEM — M62.838 CERVICAL PARASPINAL MUSCLE SPASM: Status: ACTIVE | Noted: 2025-07-05

## 2025-07-05 PROBLEM — S13.9XXA NECK SPRAIN, INITIAL ENCOUNTER: Status: ACTIVE | Noted: 2025-07-05

## 2025-07-05 PROBLEM — S29.019A STRAIN OF THORACIC REGION, INITIAL ENCOUNTER: Status: ACTIVE | Noted: 2025-07-05

## 2025-07-05 PROCEDURE — 99213 OFFICE O/P EST LOW 20 MIN: CPT

## 2025-07-13 ENCOUNTER — NON-APPOINTMENT (OUTPATIENT)
Age: 45
End: 2025-07-13

## 2025-07-16 ENCOUNTER — APPOINTMENT (OUTPATIENT)
Dept: ORTHOPEDIC SURGERY | Facility: CLINIC | Age: 45
End: 2025-07-16
Payer: COMMERCIAL

## 2025-07-16 PROCEDURE — 99203 OFFICE O/P NEW LOW 30 MIN: CPT

## 2025-07-16 PROCEDURE — 99213 OFFICE O/P EST LOW 20 MIN: CPT

## 2025-07-18 ENCOUNTER — APPOINTMENT (OUTPATIENT)
Dept: ORTHOPEDIC SURGERY | Facility: CLINIC | Age: 45
End: 2025-07-18

## 2025-07-21 ENCOUNTER — APPOINTMENT (OUTPATIENT)
Dept: INTERNAL MEDICINE | Facility: CLINIC | Age: 45
End: 2025-07-21

## 2025-07-21 ENCOUNTER — RESULT REVIEW (OUTPATIENT)
Age: 45
End: 2025-07-21

## 2025-07-21 ENCOUNTER — APPOINTMENT (OUTPATIENT)
Dept: NEUROLOGY | Facility: CLINIC | Age: 45
End: 2025-07-21

## 2025-07-23 ENCOUNTER — APPOINTMENT (OUTPATIENT)
Dept: ORTHOPEDIC SURGERY | Facility: CLINIC | Age: 45
End: 2025-07-23

## 2025-07-23 ENCOUNTER — APPOINTMENT (OUTPATIENT)
Dept: INTERNAL MEDICINE | Facility: CLINIC | Age: 45
End: 2025-07-23
Payer: COMMERCIAL

## 2025-07-23 DIAGNOSIS — R68.89 OTHER GENERAL SYMPTOMS AND SIGNS: ICD-10-CM

## 2025-07-23 PROCEDURE — G2211 COMPLEX E/M VISIT ADD ON: CPT | Mod: NC

## 2025-07-23 PROCEDURE — 99214 OFFICE O/P EST MOD 30 MIN: CPT

## 2025-07-25 DIAGNOSIS — M53.3 SACROCOCCYGEAL DISORDERS, NOT ELSEWHERE CLASSIFIED: ICD-10-CM

## 2025-08-15 ENCOUNTER — APPOINTMENT (OUTPATIENT)
Dept: ORTHOPEDIC SURGERY | Facility: CLINIC | Age: 45
End: 2025-08-15

## 2025-08-29 ENCOUNTER — APPOINTMENT (OUTPATIENT)
Dept: ORTHOPEDIC SURGERY | Facility: CLINIC | Age: 45
End: 2025-08-29
Payer: COMMERCIAL

## 2025-08-29 DIAGNOSIS — M22.41 CHONDROMALACIA PATELLAE, RIGHT KNEE: ICD-10-CM

## 2025-08-29 DIAGNOSIS — M25.561 PAIN IN RIGHT KNEE: ICD-10-CM

## 2025-08-29 DIAGNOSIS — M22.2X2 PATELLOFEMORAL DISORDERS, RIGHT KNEE: ICD-10-CM

## 2025-08-29 DIAGNOSIS — M22.2X1 PATELLOFEMORAL DISORDERS, RIGHT KNEE: ICD-10-CM

## 2025-08-29 PROCEDURE — 99213 OFFICE O/P EST LOW 20 MIN: CPT

## 2025-09-05 ENCOUNTER — APPOINTMENT (OUTPATIENT)
Dept: ORTHOPEDIC SURGERY | Facility: CLINIC | Age: 45
End: 2025-09-05

## 2025-09-12 ENCOUNTER — APPOINTMENT (OUTPATIENT)
Dept: ORTHOPEDIC SURGERY | Facility: CLINIC | Age: 45
End: 2025-09-12

## 2025-09-12 ENCOUNTER — APPOINTMENT (OUTPATIENT)
Dept: ORTHOPEDIC SURGERY | Facility: CLINIC | Age: 45
End: 2025-09-12
Payer: COMMERCIAL

## 2025-09-12 DIAGNOSIS — M22.41 CHONDROMALACIA PATELLAE, RIGHT KNEE: ICD-10-CM

## 2025-09-12 DIAGNOSIS — M76.899 OTHER SPECIFIED ENTHESOPATHIES OF UNSPECIFIED LOWER LIMB, EXCLUDING FOOT: ICD-10-CM

## 2025-09-12 DIAGNOSIS — M25.561 PAIN IN RIGHT KNEE: ICD-10-CM

## 2025-09-12 PROCEDURE — 99213 OFFICE O/P EST LOW 20 MIN: CPT
